# Patient Record
Sex: MALE | Race: WHITE | Employment: FULL TIME | URBAN - METROPOLITAN AREA
[De-identification: names, ages, dates, MRNs, and addresses within clinical notes are randomized per-mention and may not be internally consistent; named-entity substitution may affect disease eponyms.]

---

## 2017-09-19 ENCOUNTER — OFFICE VISIT (OUTPATIENT)
Dept: SURGERY | Age: 42
End: 2017-09-19

## 2017-09-19 VITALS
TEMPERATURE: 99.1 F | DIASTOLIC BLOOD PRESSURE: 84 MMHG | OXYGEN SATURATION: 98 % | BODY MASS INDEX: 38.36 KG/M2 | SYSTOLIC BLOOD PRESSURE: 124 MMHG | HEIGHT: 76 IN | RESPIRATION RATE: 18 BRPM | WEIGHT: 315 LBS | HEART RATE: 82 BPM

## 2017-09-19 DIAGNOSIS — K42.0 RECURRENT UMBILICAL HERNIA WITH INCARCERATION: Primary | ICD-10-CM

## 2017-09-19 RX ORDER — METFORMIN HYDROCHLORIDE 500 MG/1
500 TABLET ORAL 2 TIMES DAILY WITH MEALS
COMMUNITY
Start: 2017-09-15

## 2017-09-19 RX ORDER — AMLODIPINE BESYLATE AND BENAZEPRIL HYDROCHLORIDE 10; 40 MG/1; MG/1
1 CAPSULE ORAL DAILY
COMMUNITY
Start: 2017-09-15

## 2017-09-19 RX ORDER — TRAZODONE HYDROCHLORIDE 50 MG/1
50 TABLET ORAL
COMMUNITY
Start: 2017-09-15

## 2017-09-19 RX ORDER — ALLOPURINOL 300 MG/1
300 TABLET ORAL DAILY
COMMUNITY
Start: 2017-09-15

## 2017-09-19 RX ORDER — MONTELUKAST SODIUM 10 MG/1
10 TABLET ORAL DAILY
COMMUNITY
Start: 2017-09-15

## 2017-09-19 NOTE — LETTER
9/22/2017 1:44 PM 
 
Patient:  Alex Maier YOB: 1975 Date of Visit: 9/19/2017 Dear Karthik Ramirez MD 
[address] [facility], [place], operated by [facility] [address] VIA Facsimile: [phone] 
 : Thank you for referring Mr. Alex Maier to me for evaluation/treatment. Below are the relevant portions of my assessment and plan of care. If you have questions, please do not hesitate to call me. I look forward to following Mr. Syed King along with you. Sincerely, Kay Scott MD

## 2017-09-19 NOTE — MR AVS SNAPSHOT
Visit Information Date & Time Provider Department Dept. Phone Encounter #  
 9/19/2017  1:40 PM MD Renato [facility] [address] [phone] [id number] Your Appointments 10/16/2017 11:00 AM  
POST OP with ROMI Dejesus  
[facility] [address] ([address]) Appt Note: 8/38/57NF:[id number] recurrent umbilical hernia repair with mesh. lives 2 hr away [address] [facility] [address] [facility] [address]  
[address] [facility] Upcoming Health Maintenance Date Due DTaP/Tdap/Td series (1 - Tdap) 4/3/1998 INFLUENZA AGE 9 TO ADULT 8/1/2017 Allergies as of 9/19/2017  Never Reviewed Severity Noted Reaction Type Reactions Sulfa (Sulfonamide Antibiotics)  09/19/2017    Hives And breathing problems Current Immunizations  Never Reviewed No immunizations on file. Not reviewed this visit You Were Diagnosed With   
  
 Codes Comments Recurrent umbilical hernia with incarceration    -  Primary ICD-10-CM: K42.0 ICD-9-CM: 552.1 Vitals BP Pulse Temp Resp Height(growth percentile) Weight(growth percentile) 124/84 82 99.1 °F (37.3 °C) 18 6' 4\" (1.93 m) 318 lb (144.2 kg) SpO2 BMI Smoking Status 98% 38.71 kg/m2 Never Smoker Vitals History BMI and BSA Data Body Mass Index Body Surface Area 38.71 kg/m 2 2.78 m 2 Your Updated Medication List  
  
   
This list is accurate as of: 9/19/17 11:59 PM.  Always use your most recent med list.  
  
  
  
  
 allopurinol 300 mg tablet Commonly known as:  ZYLOPRIM  
  
 amLODIPine-benazepril 10-40 mg per capsule Commonly known as:  LOTREL  
  
 metFORMIN 500 mg tablet Commonly known as:  GLUCOPHAGE  
  
 montelukast 10 mg tablet Commonly known as:  SINGULAIR  
  
 traZODone 50 mg tablet Commonly known as:  Joana Odor Patient Instructions Abdominal Hernia Repair: Before Your Surgery What is abdominal hernia repair surgery? An abdominal hernia repair is a type of surgery. It fixes a problem called a hernia. A hernia is a bulge under the skin in your belly. It happens when you have a weak spot in your belly muscles and a piece of your intestines or tissues pokes through your muscles. This can cause pain. You may notice the pain most when you lift something heavy. You can have a hernia near your belly button. Or it may be in a scar from an earlier surgery. To fix it, the doctor will do one of two kinds of surgery. In open surgery, the doctor makes one cut near the hernia. This cut is called an incision. In laparoscopic surgery, the doctor makes several very small incisions and uses a thin, lighted scope and small tools. In either type of surgery, the doctor pushes the bulge back in place, if needed. Then the doctor sews the healthy tissue back together. Often the doctor patches the weak spot with a piece of material. 
Laparoscopic surgery leaves several small scars. Open surgery leaves one long scar. The scars fade with time. You will probably need to take 1 to 2 weeks off from work. But if your job requires heavy lifting or other physical work, you may need to take 4 to 6 weeks off. Follow-up care is a key part of your treatment and safety. Be sure to make and go to all appointments, and call your doctor if you are having problems. It's also a good idea to know your test results and keep a list of the medicines you take. What happens before surgery? Surgery can be stressful. This information will help you understand what you can expect. And it will help you safely prepare for surgery. Preparing for surgery · Understand exactly what surgery is planned, along with the risks, benefits, and other options. · Tell your doctors ALL the medicines, vitamins, supplements, and herbal remedies you take.  Some of these can increase the risk of bleeding or interact with anesthesia. · If you take blood thinners, such as warfarin (Coumadin), clopidogrel (Plavix), or aspirin, be sure to talk to your doctor. He or she will tell you if you should stop taking these medicines before your surgery. Make sure that you understand exactly what your doctor wants you to do. · Your doctor will tell you which medicines to take or stop before your surgery. You may need to stop taking certain medicines a week or more before surgery. So talk to your doctor as soon as you can. · If you have an advance directive, let your doctor know. It may include a living will and a durable power of  for health care. Bring a copy to the hospital. If you don't have one, you may want to prepare one. It lets your doctor and loved ones know your health care wishes. Doctors advise that everyone prepare these papers before any type of surgery or procedure. What happens on the day of surgery? · Follow the instructions exactly about when to stop eating and drinking. If you don't, your surgery may be canceled. If your doctor told you to take your medicines on the day of surgery, take them with only a sip of water. · Take a bath or shower before you come in for your surgery. Do not apply lotions, perfumes, deodorants, or nail polish. · Do not shave the surgical site yourself. · Take off all jewelry and piercings. And take out contact lenses, if you wear them. At the hospital or surgery center · Bring a picture ID. · The area for surgery is often marked to make sure there are no errors. · You will be kept comfortable and safe by your anesthesia provider. You will be asleep during the surgery. · The surgery will take 30 minutes to 2 hours. It depends on how large the hernia is and where it is. Going home · Be sure you have someone to drive you home. Anesthesia and pain medicine make it unsafe for you to drive.  
· You will be given more specific instructions about recovering from your surgery. They will cover things like diet, wound care, follow-up care, driving, and getting back to your normal routine. When should you call your doctor? · You have questions or concerns. · You don't understand how to prepare for your surgery. · You become ill before the surgery (such as fever, flu, or a cold). · You need to reschedule or have changed your mind about having the surgery. Where can you learn more? Go to http://renetta-mazin.info/. Enter U288 in the search box to learn more about \"Abdominal Hernia Repair: Before Your Surgery. \" Current as of: August 9, 2016 Content Version: 11.3 © 5094-2055 [employer]. Care instructions adapted under license by [employer] (which disclaims liability or warranty for this information). If you have questions about a medical condition or this instruction, always ask your healthcare professional. [address] any warranty or liability for your use of this information. Introducing [facility]! Yesi Ch introduces [employer] patient portal. Now you can access parts of your medical record, email your doctor's office, and request medication refills online. 1. In your internet browser, go to [email or web address] 2. Click on the First Time User? Click Here link in the Sign In box. You will see the New Member Sign Up page. 3. Enter your [employer] Access Code exactly as it appears below. You will not need to use this code after youve completed the sign-up process. If you do not sign up before the expiration date, you must request a new code. · [employer] Access Code: [email or web address] Expires: 12/18/2017  2:20 PM 
 
4. Enter the last four digits of your Social Security Number (xxxx) and Date of Birth (mm/dd/yyyy) as indicated and click Submit. You will be taken to the next sign-up page. 5. Create a [employer] ID.  This will be your [employer] login ID and cannot be changed, so think of one that is secure and easy to remember. 6. Create a [employer] password. You can change your password at any time. 7. Enter your Password Reset Question and Answer. This can be used at a later time if you forget your password. 8. Enter your e-mail address. You will receive e-mail notification when new information is available in [address] 9. Click Sign Up. You can now view and download portions of your medical record. 10. Click the Download Summary menu link to download a portable copy of your medical information. If you have questions, please visit the Frequently Asked Questions section of the [employer] website. Remember, [employer] is NOT to be used for urgent needs. For medical emergencies, dial 911. Now available from your iPhone and Android! Please provide this summary of care documentation to your next provider. Your primary care clinician is listed as [place]. If you have any questions after today's visit, please call [phone].

## 2017-09-19 NOTE — PROGRESS NOTES
1. Have you been to the ER, urgent care clinic since your last visit? Hospitalized since your last visit? [address] ed pain abd    2. Have you seen or consulted any other health care providers outside of the [address] [place] [address] since your last visit? Include any pap smears or colon screening.    PCP

## 2017-09-22 NOTE — PROGRESS NOTES
[facility] General Surgery History and Physical    History of Present Illness:      Juarez Krishnan is a 38 y.o. male who has a recurrent umbilical hernia. He had a previous open umbilical hernia in 6091 repaired open without mesh. He has been having some increasing pain over the last few months. He has noticed the bulge just above the umbilicus getting bigger. The pain is a 3 of 10. He does not have any nausea, vomiting, or changes in BM. Past Medical History:   Diagnosis Date    Asthma     Diabetes ([address]     Hypertension        Past Surgical History:   Procedure Laterality Date    HX HERNIA REPAIR      HX PARATHYROIDECTOMY           Current Outpatient Prescriptions:     allopurinol (ZYLOPRIM) 300 mg tablet, , Disp: , Rfl:     amLODIPine-benazepril (LOTREL) 10-40 mg per capsule, , Disp: , Rfl:     traZODone (DESYREL) 50 mg tablet, , Disp: , Rfl:     metFORMIN (GLUCOPHAGE) 500 mg tablet, , Disp: , Rfl:     montelukast (SINGULAIR) 10 mg tablet, , Disp: , Rfl:     Allergies   Allergen Reactions    Sulfa (Sulfonamide Antibiotics) Hives     And breathing problems       Social History     Social History    Marital status:      Spouse name: N/A    Number of children: N/A    Years of education: N/A     Occupational History    Not on file.      Social History Main Topics    Smoking status: Never Smoker    Smokeless tobacco: Never Used    Alcohol use Yes    Drug use: No    Sexual activity: Not on file     Other Topics Concern    Not on file     Social History Narrative       Family History   Problem Relation Age of Onset    Heart Disease Father     Hypertension Father     Diabetes Maternal Grandmother     Cancer Maternal Grandfather        ROS   Constitutional: negative  Ears, Nose, Mouth, Throat, and Face: negative  Respiratory: negative  Cardiovascular: negative  Gastrointestinal: positive for abdominal pain  Genitourinary:negative  Integument/Breast: negative  Hematologic/Lymphatic: negative  Behavioral/Psychiatric: negative  Allergic/Immunologic: negative      Physical Exam:     Visit Vitals    /84    Pulse 82    Temp 99.1 °F (37.3 °C)    Resp 18    Ht 6' 4\" (1.93 m)    Wt 318 lb (144.2 kg)    SpO2 98%    BMI 38.71 kg/m2       General - alert and oriented, no apparent distress  HEENT - no jaundice, no hearing imparement  Pulm - CTAB, no C/W/R  CV - RRR, no M/R/G  Abd - soft, ND, BS present, umbilical hernia present just above the umbilicus, mild TTP, nonreducible, feels like fatty tissue in hernia, no erythema  Ext - pulses intact in UE and LE bilaterally, no edema  Skin - supple, no rashes  Psychiatric - normal affect, good mood    Labs  none    Imaging  CT from [facility] - 6.9PL umbilical hernia present with fat in the defect  I have reviewed and agree with all of the pertinent images    Assessment:     Joy Patel is a 38 y.o. male with recurrent incarcerated umbilical hernia    Recommendations:     1. He will need robotic recurrent umbilical hernia repair with mesh the OR. I have discussed the above procedure with the patient in detail. We reviewed the benefits and possible complications of the surgery which include bleeding, infection, damage to adjacent organs, venous thromboembolism, need for repeat surgery, death and other unforseen complications. The patient agreed to proceed with the surgery. Cong Guzman MD    Mr. Mariella Yu has a reminder for a \"due or due soon\" health maintenance. I have asked that he contact his primary care provider for follow-up on this health maintenance.

## 2017-09-22 NOTE — PATIENT INSTRUCTIONS
Abdominal Hernia Repair: Before Your Surgery  What is abdominal hernia repair surgery? An abdominal hernia repair is a type of surgery. It fixes a problem called a hernia. A hernia is a bulge under the skin in your belly. It happens when you have a weak spot in your belly muscles and a piece of your intestines or tissues pokes through your muscles. This can cause pain. You may notice the pain most when you lift something heavy. You can have a hernia near your belly button. Or it may be in a scar from an earlier surgery. To fix it, the doctor will do one of two kinds of surgery. In open surgery, the doctor makes one cut near the hernia. This cut is called an incision. In laparoscopic surgery, the doctor makes several very small incisions and uses a thin, lighted scope and small tools. In either type of surgery, the doctor pushes the bulge back in place, if needed. Then the doctor sews the healthy tissue back together. Often the doctor patches the weak spot with a piece of material.  Laparoscopic surgery leaves several small scars. Open surgery leaves one long scar. The scars fade with time. You will probably need to take 1 to 2 weeks off from work. But if your job requires heavy lifting or other physical work, you may need to take 4 to 6 weeks off. Follow-up care is a key part of your treatment and safety. Be sure to make and go to all appointments, and call your doctor if you are having problems. It's also a good idea to know your test results and keep a list of the medicines you take. What happens before surgery? Surgery can be stressful. This information will help you understand what you can expect. And it will help you safely prepare for surgery. Preparing for surgery  · Understand exactly what surgery is planned, along with the risks, benefits, and other options. · Tell your doctors ALL the medicines, vitamins, supplements, and herbal remedies you take.  Some of these can increase the risk of bleeding or interact with anesthesia. · If you take blood thinners, such as warfarin (Coumadin), clopidogrel (Plavix), or aspirin, be sure to talk to your doctor. He or she will tell you if you should stop taking these medicines before your surgery. Make sure that you understand exactly what your doctor wants you to do. · Your doctor will tell you which medicines to take or stop before your surgery. You may need to stop taking certain medicines a week or more before surgery. So talk to your doctor as soon as you can. · If you have an advance directive, let your doctor know. It may include a living will and a durable power of  for health care. Bring a copy to the hospital. If you don't have one, you may want to prepare one. It lets your doctor and loved ones know your health care wishes. Doctors advise that everyone prepare these papers before any type of surgery or procedure. What happens on the day of surgery? · Follow the instructions exactly about when to stop eating and drinking. If you don't, your surgery may be canceled. If your doctor told you to take your medicines on the day of surgery, take them with only a sip of water. · Take a bath or shower before you come in for your surgery. Do not apply lotions, perfumes, deodorants, or nail polish. · Do not shave the surgical site yourself. · Take off all jewelry and piercings. And take out contact lenses, if you wear them. At the hospital or surgery center  · Bring a picture ID. · The area for surgery is often marked to make sure there are no errors. · You will be kept comfortable and safe by your anesthesia provider. You will be asleep during the surgery. · The surgery will take 30 minutes to 2 hours. It depends on how large the hernia is and where it is. Going home  · Be sure you have someone to drive you home. Anesthesia and pain medicine make it unsafe for you to drive.   · You will be given more specific instructions about recovering from your surgery. They will cover things like diet, wound care, follow-up care, driving, and getting back to your normal routine. When should you call your doctor? · You have questions or concerns. · You don't understand how to prepare for your surgery. · You become ill before the surgery (such as fever, flu, or a cold). · You need to reschedule or have changed your mind about having the surgery. Where can you learn more? Go to http://renetta-mazin.info/. Enter U288 in the search box to learn more about \"Abdominal Hernia Repair: Before Your Surgery. \"  Current as of: August 9, 2016  Content Version: 11.3  © 0124-4514 [employer] Care instructions adapted under license by [employer] (which disclaims liability or warranty for this information). If you have questions about a medical condition or this instruction, always ask your healthcare professional. [address] any warranty or liability for your use of this information.

## 2017-09-27 NOTE — PERIOP NOTES
PAT instructions reviewed with patient and given the opportunity to ask questions. Connect care message sent to JAILYN Franco LPN/Dr. Dacosta's office re: preop orders needed for patient to include BMP, EKG,CXR per anesthesia protocol.

## 2017-09-28 ENCOUNTER — DOCUMENTATION ONLY (OUTPATIENT)
Dept: SURGERY | Age: 42
End: 2017-09-28

## 2017-09-28 NOTE — PROGRESS NOTES
PREOP TEL. CALL COMPLETED ON THIS PATIENT TODAY.  NO RECENT LABS, EKG, CXR SEEN IN [facility]. PATIENT HAS HTN, DM, ASTHMA. ANESTHESIA PROTOCOL REQUIRES BMP, EKG, CXR FOR THESE CONDITIONS. ORDERS SIGNED AND HELD DO NOT INCLUDE THESE TESTS. THANKS FOR YOUR ATTENTION TO THIS MATTER. Shaista Colorado RN                Message given to Dr Sandi Mancuso.

## 2017-09-29 ENCOUNTER — ANESTHESIA EVENT (OUTPATIENT)
Dept: SURGERY | Age: 42
End: 2017-09-29
Payer: COMMERCIAL

## 2017-09-29 ENCOUNTER — ANESTHESIA (OUTPATIENT)
Dept: SURGERY | Age: 42
End: 2017-09-29
Payer: COMMERCIAL

## 2017-09-29 ENCOUNTER — HOSPITAL ENCOUNTER (OUTPATIENT)
Age: 42
Setting detail: OUTPATIENT SURGERY
Discharge: HOME | End: 2017-09-29
Attending: SURGERY | Admitting: SURGERY
Payer: COMMERCIAL

## 2017-09-29 VITALS
HEART RATE: 75 BPM | TEMPERATURE: 97.8 F | SYSTOLIC BLOOD PRESSURE: 159 MMHG | DIASTOLIC BLOOD PRESSURE: 98 MMHG | BODY MASS INDEX: 37.19 KG/M2 | OXYGEN SATURATION: 96 % | WEIGHT: 315 LBS | RESPIRATION RATE: 13 BRPM | HEIGHT: 77 IN

## 2017-09-29 LAB
GLUCOSE BLD STRIP.AUTO-MCNC: 148 MG/DL (ref 65–100)
GLUCOSE BLD STRIP.AUTO-MCNC: 188 MG/DL (ref 65–100)
SERVICE CMNT-IMP: ABNORMAL
SERVICE CMNT-IMP: ABNORMAL

## 2017-09-29 PROCEDURE — 76060000036 HC ANESTHESIA 2.5 TO 3 HR: Performed by: SURGERY

## 2017-09-29 PROCEDURE — 76010000877 HC OR TIME 2.5 TO 3HR INTENSV - TIER 2: Performed by: SURGERY

## 2017-09-29 PROCEDURE — 77030011640 HC PAD GRND REM COVD -A: Performed by: SURGERY

## 2017-09-29 PROCEDURE — 77030002933 HC SUT MCRYL J&J -A: Performed by: SURGERY

## 2017-09-29 PROCEDURE — 77030020703 HC SEAL CANN DISP INTU -B: Performed by: SURGERY

## 2017-09-29 PROCEDURE — 77030010507 HC ADH SKN DERMBND J&J -B: Performed by: SURGERY

## 2017-09-29 PROCEDURE — 82962 GLUCOSE BLOOD TEST: CPT

## 2017-09-29 PROCEDURE — 74011000250 HC RX REV CODE- 250

## 2017-09-29 PROCEDURE — 77030020782 HC GWN BAIR PAWS FLX 3M -B

## 2017-09-29 PROCEDURE — 74011250636 HC RX REV CODE- 250/636: Performed by: ANESTHESIOLOGY

## 2017-09-29 PROCEDURE — C1781 MESH (IMPLANTABLE): HCPCS | Performed by: SURGERY

## 2017-09-29 PROCEDURE — 77030035278 HC STPLR SEAL ENDOWR INTU -B: Performed by: SURGERY

## 2017-09-29 PROCEDURE — 77030036554: Performed by: SURGERY

## 2017-09-29 PROCEDURE — 77030032490 HC SLV COMPR SCD KNE COVD -B: Performed by: SURGERY

## 2017-09-29 PROCEDURE — 77030031139 HC SUT VCRL2 J&J -A: Performed by: SURGERY

## 2017-09-29 PROCEDURE — 74011250636 HC RX REV CODE- 250/636: Performed by: SURGERY

## 2017-09-29 PROCEDURE — 74011000250 HC RX REV CODE- 250: Performed by: SURGERY

## 2017-09-29 PROCEDURE — 74011250637 HC RX REV CODE- 250/637

## 2017-09-29 PROCEDURE — 77030035489 HC REDUCR CANN ENDOWR INTU -C: Performed by: SURGERY

## 2017-09-29 PROCEDURE — 74011250636 HC RX REV CODE- 250/636

## 2017-09-29 PROCEDURE — 77030035048 HC TRCR ENDOSC OPTCL COVD -B: Performed by: SURGERY

## 2017-09-29 PROCEDURE — 77030008557 HC TBNG SMK EVAC STOR -B: Performed by: SURGERY

## 2017-09-29 PROCEDURE — 76210000016 HC OR PH I REC 1 TO 1.5 HR: Performed by: SURGERY

## 2017-09-29 PROCEDURE — 77030035277 HC OBTRTR BLDELSS DISP INTU -B: Performed by: SURGERY

## 2017-09-29 PROCEDURE — 77030002895 HC DEV VASC CLOSR COVD -B: Performed by: SURGERY

## 2017-09-29 PROCEDURE — 77030018836 HC SOL IRR NACL ICUM -A: Performed by: SURGERY

## 2017-09-29 PROCEDURE — 76210000020 HC REC RM PH II FIRST 0.5 HR: Performed by: SURGERY

## 2017-09-29 DEVICE — MESH HERN CIRCLE 4.5IN -- VENTRALIGHT S/T: Type: IMPLANTABLE DEVICE | Site: UMBILICAL | Status: FUNCTIONAL

## 2017-09-29 RX ORDER — ACETAMINOPHEN 10 MG/ML
INJECTION, SOLUTION INTRAVENOUS AS NEEDED
Status: DISCONTINUED | OUTPATIENT
Start: 2017-09-29 | End: 2017-09-29 | Stop reason: HOSPADM

## 2017-09-29 RX ORDER — OXYCODONE AND ACETAMINOPHEN 5; 325 MG/1; MG/1
1 TABLET ORAL ONCE
Status: COMPLETED | OUTPATIENT
Start: 2017-09-29 | End: 2017-09-29

## 2017-09-29 RX ORDER — ONDANSETRON 2 MG/ML
4 INJECTION INTRAMUSCULAR; INTRAVENOUS AS NEEDED
Status: DISCONTINUED | OUTPATIENT
Start: 2017-09-29 | End: 2017-09-29 | Stop reason: HOSPADM

## 2017-09-29 RX ORDER — ROCURONIUM BROMIDE 10 MG/ML
INJECTION, SOLUTION INTRAVENOUS AS NEEDED
Status: DISCONTINUED | OUTPATIENT
Start: 2017-09-29 | End: 2017-09-29 | Stop reason: HOSPADM

## 2017-09-29 RX ORDER — SODIUM CHLORIDE 0.9 % (FLUSH) 0.9 %
5-10 SYRINGE (ML) INJECTION AS NEEDED
Status: DISCONTINUED | OUTPATIENT
Start: 2017-09-29 | End: 2017-09-29 | Stop reason: HOSPADM

## 2017-09-29 RX ORDER — OXYCODONE AND ACETAMINOPHEN 5; 325 MG/1; MG/1
TABLET ORAL
Status: COMPLETED
Start: 2017-09-29 | End: 2017-09-29

## 2017-09-29 RX ORDER — SODIUM CHLORIDE, SODIUM LACTATE, POTASSIUM CHLORIDE, CALCIUM CHLORIDE 600; 310; 30; 20 MG/100ML; MG/100ML; MG/100ML; MG/100ML
100 INJECTION, SOLUTION INTRAVENOUS CONTINUOUS
Status: DISCONTINUED | OUTPATIENT
Start: 2017-09-29 | End: 2017-09-29 | Stop reason: HOSPADM

## 2017-09-29 RX ORDER — MIDAZOLAM HYDROCHLORIDE 1 MG/ML
INJECTION, SOLUTION INTRAMUSCULAR; INTRAVENOUS AS NEEDED
Status: DISCONTINUED | OUTPATIENT
Start: 2017-09-29 | End: 2017-09-29 | Stop reason: HOSPADM

## 2017-09-29 RX ORDER — SUCCINYLCHOLINE CHLORIDE 20 MG/ML
INJECTION INTRAMUSCULAR; INTRAVENOUS AS NEEDED
Status: DISCONTINUED | OUTPATIENT
Start: 2017-09-29 | End: 2017-09-29 | Stop reason: HOSPADM

## 2017-09-29 RX ORDER — NEOSTIGMINE METHYLSULFATE 1 MG/ML
INJECTION INTRAVENOUS AS NEEDED
Status: DISCONTINUED | OUTPATIENT
Start: 2017-09-29 | End: 2017-09-29 | Stop reason: HOSPADM

## 2017-09-29 RX ORDER — GLYCOPYRROLATE 0.2 MG/ML
INJECTION INTRAMUSCULAR; INTRAVENOUS AS NEEDED
Status: DISCONTINUED | OUTPATIENT
Start: 2017-09-29 | End: 2017-09-29 | Stop reason: HOSPADM

## 2017-09-29 RX ORDER — FENTANYL CITRATE 50 UG/ML
50 INJECTION, SOLUTION INTRAMUSCULAR; INTRAVENOUS AS NEEDED
Status: DISCONTINUED | OUTPATIENT
Start: 2017-09-29 | End: 2017-09-29 | Stop reason: HOSPADM

## 2017-09-29 RX ORDER — LIDOCAINE HYDROCHLORIDE 20 MG/ML
INJECTION, SOLUTION EPIDURAL; INFILTRATION; INTRACAUDAL; PERINEURAL AS NEEDED
Status: DISCONTINUED | OUTPATIENT
Start: 2017-09-29 | End: 2017-09-29 | Stop reason: HOSPADM

## 2017-09-29 RX ORDER — FENTANYL CITRATE 50 UG/ML
25 INJECTION, SOLUTION INTRAMUSCULAR; INTRAVENOUS
Status: COMPLETED | OUTPATIENT
Start: 2017-09-29 | End: 2017-09-29

## 2017-09-29 RX ORDER — OXYCODONE AND ACETAMINOPHEN 5; 325 MG/1; MG/1
1-2 TABLET ORAL
Qty: 40 TAB | Refills: 0 | Status: SHIPPED | OUTPATIENT
Start: 2017-09-29 | End: 2017-10-16 | Stop reason: ALTCHOICE

## 2017-09-29 RX ORDER — SODIUM CHLORIDE, SODIUM LACTATE, POTASSIUM CHLORIDE, CALCIUM CHLORIDE 600; 310; 30; 20 MG/100ML; MG/100ML; MG/100ML; MG/100ML
INJECTION, SOLUTION INTRAVENOUS
Status: DISCONTINUED | OUTPATIENT
Start: 2017-09-29 | End: 2017-09-29 | Stop reason: HOSPADM

## 2017-09-29 RX ORDER — LABETALOL HYDROCHLORIDE 5 MG/ML
INJECTION, SOLUTION INTRAVENOUS AS NEEDED
Status: DISCONTINUED | OUTPATIENT
Start: 2017-09-29 | End: 2017-09-29 | Stop reason: HOSPADM

## 2017-09-29 RX ORDER — HYDROMORPHONE HYDROCHLORIDE 1 MG/ML
INJECTION, SOLUTION INTRAMUSCULAR; INTRAVENOUS; SUBCUTANEOUS AS NEEDED
Status: DISCONTINUED | OUTPATIENT
Start: 2017-09-29 | End: 2017-09-29 | Stop reason: HOSPADM

## 2017-09-29 RX ORDER — ONDANSETRON 2 MG/ML
INJECTION INTRAMUSCULAR; INTRAVENOUS AS NEEDED
Status: DISCONTINUED | OUTPATIENT
Start: 2017-09-29 | End: 2017-09-29 | Stop reason: HOSPADM

## 2017-09-29 RX ORDER — FENTANYL CITRATE 50 UG/ML
INJECTION, SOLUTION INTRAMUSCULAR; INTRAVENOUS AS NEEDED
Status: DISCONTINUED | OUTPATIENT
Start: 2017-09-29 | End: 2017-09-29 | Stop reason: HOSPADM

## 2017-09-29 RX ORDER — MIDAZOLAM HYDROCHLORIDE 1 MG/ML
0.5 INJECTION, SOLUTION INTRAMUSCULAR; INTRAVENOUS
Status: DISCONTINUED | OUTPATIENT
Start: 2017-09-29 | End: 2017-09-29 | Stop reason: HOSPADM

## 2017-09-29 RX ORDER — DEXAMETHASONE SODIUM PHOSPHATE 4 MG/ML
INJECTION, SOLUTION INTRA-ARTICULAR; INTRALESIONAL; INTRAMUSCULAR; INTRAVENOUS; SOFT TISSUE AS NEEDED
Status: DISCONTINUED | OUTPATIENT
Start: 2017-09-29 | End: 2017-09-29 | Stop reason: HOSPADM

## 2017-09-29 RX ORDER — BUPIVACAINE HYDROCHLORIDE AND EPINEPHRINE 5; 5 MG/ML; UG/ML
30 INJECTION, SOLUTION EPIDURAL; INTRACAUDAL; PERINEURAL ONCE
Status: DISCONTINUED | OUTPATIENT
Start: 2017-09-29 | End: 2017-09-29 | Stop reason: HOSPADM

## 2017-09-29 RX ORDER — HYDROMORPHONE HYDROCHLORIDE 1 MG/ML
0.5 INJECTION, SOLUTION INTRAMUSCULAR; INTRAVENOUS; SUBCUTANEOUS
Status: DISCONTINUED | OUTPATIENT
Start: 2017-09-29 | End: 2017-09-29 | Stop reason: HOSPADM

## 2017-09-29 RX ORDER — MORPHINE SULFATE 10 MG/ML
2 INJECTION, SOLUTION INTRAMUSCULAR; INTRAVENOUS
Status: DISCONTINUED | OUTPATIENT
Start: 2017-09-29 | End: 2017-09-29 | Stop reason: HOSPADM

## 2017-09-29 RX ORDER — LIDOCAINE HYDROCHLORIDE 10 MG/ML
0.1 INJECTION, SOLUTION EPIDURAL; INFILTRATION; INTRACAUDAL; PERINEURAL AS NEEDED
Status: DISCONTINUED | OUTPATIENT
Start: 2017-09-29 | End: 2017-09-29 | Stop reason: HOSPADM

## 2017-09-29 RX ORDER — MIDAZOLAM HYDROCHLORIDE 1 MG/ML
1 INJECTION, SOLUTION INTRAMUSCULAR; INTRAVENOUS AS NEEDED
Status: DISCONTINUED | OUTPATIENT
Start: 2017-09-29 | End: 2017-09-29 | Stop reason: HOSPADM

## 2017-09-29 RX ORDER — ROPIVACAINE HYDROCHLORIDE 5 MG/ML
30 INJECTION, SOLUTION EPIDURAL; INFILTRATION; PERINEURAL AS NEEDED
Status: DISCONTINUED | OUTPATIENT
Start: 2017-09-29 | End: 2017-09-29 | Stop reason: HOSPADM

## 2017-09-29 RX ORDER — SODIUM CHLORIDE 9 MG/ML
25 INJECTION, SOLUTION INTRAVENOUS CONTINUOUS
Status: DISCONTINUED | OUTPATIENT
Start: 2017-09-29 | End: 2017-09-29 | Stop reason: HOSPADM

## 2017-09-29 RX ORDER — SODIUM CHLORIDE 0.9 % (FLUSH) 0.9 %
5-10 SYRINGE (ML) INJECTION EVERY 8 HOURS
Status: DISCONTINUED | OUTPATIENT
Start: 2017-09-29 | End: 2017-09-29 | Stop reason: HOSPADM

## 2017-09-29 RX ORDER — DIPHENHYDRAMINE HYDROCHLORIDE 50 MG/ML
12.5 INJECTION, SOLUTION INTRAMUSCULAR; INTRAVENOUS AS NEEDED
Status: DISCONTINUED | OUTPATIENT
Start: 2017-09-29 | End: 2017-09-29 | Stop reason: HOSPADM

## 2017-09-29 RX ORDER — BUPIVACAINE HYDROCHLORIDE AND EPINEPHRINE 2.5; 5 MG/ML; UG/ML
INJECTION, SOLUTION EPIDURAL; INFILTRATION; INTRACAUDAL; PERINEURAL AS NEEDED
Status: DISCONTINUED | OUTPATIENT
Start: 2017-09-29 | End: 2017-09-29 | Stop reason: HOSPADM

## 2017-09-29 RX ADMIN — DEXAMETHASONE SODIUM PHOSPHATE 4 MG: 4 INJECTION, SOLUTION INTRA-ARTICULAR; INTRALESIONAL; INTRAMUSCULAR; INTRAVENOUS; SOFT TISSUE at 08:18

## 2017-09-29 RX ADMIN — ONDANSETRON 4 MG: 2 INJECTION INTRAMUSCULAR; INTRAVENOUS at 08:18

## 2017-09-29 RX ADMIN — ROCURONIUM BROMIDE 5 MG: 10 INJECTION, SOLUTION INTRAVENOUS at 09:01

## 2017-09-29 RX ADMIN — LIDOCAINE HYDROCHLORIDE 100 MG: 20 INJECTION, SOLUTION EPIDURAL; INFILTRATION; INTRACAUDAL; PERINEURAL at 07:36

## 2017-09-29 RX ADMIN — ONDANSETRON 4 MG: 2 INJECTION INTRAMUSCULAR; INTRAVENOUS at 10:52

## 2017-09-29 RX ADMIN — SODIUM CHLORIDE, SODIUM LACTATE, POTASSIUM CHLORIDE, CALCIUM CHLORIDE: 600; 310; 30; 20 INJECTION, SOLUTION INTRAVENOUS at 07:27

## 2017-09-29 RX ADMIN — NEOSTIGMINE METHYLSULFATE 4 MG: 1 INJECTION INTRAVENOUS at 10:06

## 2017-09-29 RX ADMIN — ACETAMINOPHEN 1000 MG: 10 INJECTION, SOLUTION INTRAVENOUS at 08:01

## 2017-09-29 RX ADMIN — HYDROMORPHONE HYDROCHLORIDE 0.25 MG: 1 INJECTION, SOLUTION INTRAMUSCULAR; INTRAVENOUS; SUBCUTANEOUS at 09:58

## 2017-09-29 RX ADMIN — OXYCODONE HYDROCHLORIDE AND ACETAMINOPHEN 1 TABLET: 5; 325 TABLET ORAL at 11:33

## 2017-09-29 RX ADMIN — GLYCOPYRROLATE 0.2 MG: 0.2 INJECTION INTRAMUSCULAR; INTRAVENOUS at 10:06

## 2017-09-29 RX ADMIN — HYDROMORPHONE HYDROCHLORIDE 0.5 MG: 1 INJECTION, SOLUTION INTRAMUSCULAR; INTRAVENOUS; SUBCUTANEOUS at 07:54

## 2017-09-29 RX ADMIN — FENTANYL CITRATE 25 MCG: 50 INJECTION, SOLUTION INTRAMUSCULAR; INTRAVENOUS at 11:06

## 2017-09-29 RX ADMIN — FENTANYL CITRATE 25 MCG: 50 INJECTION, SOLUTION INTRAMUSCULAR; INTRAVENOUS at 11:01

## 2017-09-29 RX ADMIN — FENTANYL CITRATE 25 MCG: 50 INJECTION, SOLUTION INTRAMUSCULAR; INTRAVENOUS at 10:51

## 2017-09-29 RX ADMIN — ROCURONIUM BROMIDE 5 MG: 10 INJECTION, SOLUTION INTRAVENOUS at 09:19

## 2017-09-29 RX ADMIN — MIDAZOLAM HYDROCHLORIDE 2 MG: 1 INJECTION, SOLUTION INTRAMUSCULAR; INTRAVENOUS at 07:27

## 2017-09-29 RX ADMIN — HYDROMORPHONE HYDROCHLORIDE 0.25 MG: 1 INJECTION, SOLUTION INTRAMUSCULAR; INTRAVENOUS; SUBCUTANEOUS at 10:10

## 2017-09-29 RX ADMIN — ROCURONIUM BROMIDE 20 MG: 10 INJECTION, SOLUTION INTRAVENOUS at 08:16

## 2017-09-29 RX ADMIN — CEFAZOLIN 3 G: 1 INJECTION, POWDER, FOR SOLUTION INTRAMUSCULAR; INTRAVENOUS; PARENTERAL at 08:00

## 2017-09-29 RX ADMIN — FENTANYL CITRATE 25 MCG: 50 INJECTION, SOLUTION INTRAMUSCULAR; INTRAVENOUS at 10:56

## 2017-09-29 RX ADMIN — SUCCINYLCHOLINE CHLORIDE 200 MG: 20 INJECTION INTRAMUSCULAR; INTRAVENOUS at 07:36

## 2017-09-29 RX ADMIN — ROCURONIUM BROMIDE 5 MG: 10 INJECTION, SOLUTION INTRAVENOUS at 07:36

## 2017-09-29 RX ADMIN — ROCURONIUM BROMIDE 35 MG: 10 INJECTION, SOLUTION INTRAVENOUS at 07:56

## 2017-09-29 RX ADMIN — OXYCODONE AND ACETAMINOPHEN 1 TABLET: 5; 325 TABLET ORAL at 11:33

## 2017-09-29 RX ADMIN — LABETALOL HYDROCHLORIDE 10 MG: 5 INJECTION, SOLUTION INTRAVENOUS at 08:14

## 2017-09-29 RX ADMIN — FENTANYL CITRATE 100 MCG: 50 INJECTION, SOLUTION INTRAMUSCULAR; INTRAVENOUS at 07:36

## 2017-09-29 NOTE — INTERVAL H&P NOTE
H&P Update:  Jerry Miller was seen and examined. History and physical has been reviewed. The patient has been examined. There have been no significant clinical changes since the completion of the originally dated History and Physical.  Patient identified by surgeon; surgical site was confirmed by patient and surgeon.     Signed By: Sofie Arevalo MD     September 29, 2017 7:22 AM

## 2017-09-29 NOTE — ANESTHESIA POSTPROCEDURE EVALUATION
Post-Anesthesia Evaluation and Assessment    Patient: Citlaly Bowens MRN: [id number]  SSN: [id number]    YOB: 1975  Age: 38 y.o. Sex: male       Cardiovascular Function/Vital Signs  Visit Vitals    BP (!) 159/98    Pulse 75    Temp 36.6 °C (97.8 °F)    Resp 13    Ht 6' 5\" (1.956 m)    Wt 145.2 kg (320 lb)    SpO2 96%    BMI 37.95 kg/m2       Patient is status post general anesthesia for Procedure(s):  DAVINCI RECURRENT UMBILICAL HERNIA REPAIR WITH MESH. Nausea/Vomiting: None    Postoperative hydration reviewed and adequate. Pain:  Pain Scale 1: Numeric (0 - 10) (09/29/17 1122)  Pain Intensity 1: 2 (09/29/17 1122)   Managed    Neurological Status:   Neuro (WDL): Within Defined Limits (09/29/17 1122)   At baseline    Mental Status and Level of Consciousness: Arousable    Pulmonary Status:   O2 Device: Room air (09/29/17 1122)   Adequate oxygenation and airway patent    Complications related to anesthesia: None    Post-anesthesia assessment completed.  No concerns    Signed By: Ammon Matthews MD     September 29, 2017

## 2017-09-29 NOTE — BRIEF OP NOTE
BRIEF OPERATIVE NOTE    Date of Procedure: 9/29/2017   Preoperative Diagnosis: INCARCERATED RECURRENT UMBILICAL HERNIA  Postoperative Diagnosis: INCARCERATED RECURRENT UMBILICAL HERNIA    Procedure(s):  DAVINCI RECURRENT UMBILICAL HERNIA REPAIR WITH MESH  Surgeon(s) and Role:     * Betito Rooney MD - Primary         Assistant Staff:       Surgical Staff:  Circ-1: Roseline Beaver RN  Circ-Relief: Sandra Krishnan RN  Scrub Tech-1: Shanel Villalobos  Surg Asst-1: Stanton Wilkerson  Event Time In   Incision Start 0801   Incision Close      Anesthesia: General   Estimated Blood Loss: none  Specimens: * No specimens in log *   Findings: 2.5cm hernia defect closed primarily and a 11cm round Bard Ventralyte ST mesh used   Complications: none  Implants:   Implant Name Type Inv. Item Serial No.  Lot No. LRB No. Used Action   MESH ERICH [place] 4. [place] S/T - [id number]   MESH ERICH [place] 4. 5IN -- VENTRALIGHT S/T   BARD DAVOL [id number] Left 1 Implanted

## 2017-09-29 NOTE — OP NOTES
[address]   [address] [place] [address], [address]   OP NOTE       Name:  Britton Hood   MR#:  [id number]   :  1975   Account #:  [de-identified]    Surgery Date:  2017   Date of Adm:  2017       PREOPERATIVE DIAGNOSIS: Incarcerated recurrent umbilical   hernia. POSTOPERATIVE DIAGNOSIS: Incarcerated recurrent umbilical   hernia. PROCEDURE PERFORMED: Laparoscopic robotic-assisted recurrent   umbilical hernia repair with mesh. SURGEON: Dianne Cunha MD.    ASSISTANT: Surgical assistant, Adin Medley. INDICATION FOR OPERATION: The patient is a 53-year-old male   who has a previous history of an open umbilical hernia repair without   mesh a number of years ago that has a recurrent hernia above the   umbilicus in the same area that is needing repair in the operating   room. DESCRIPTION OF PROCEDURE: The patient was met in the preop   holding area. The H and P was updated. Consent was signed and risks   and benefits were explained to the patient prior to the start of the   operation. He was taken back to the operating room and was laid in   the supine position. The abdomen was prepped and draped in   standard sterile fashion. Timeout was called. Antibiotics were given. SCDs were on lower extremities. We started the operation by making   an 8 mm incision to the left upper quadrant, inserting a Visiport trocar   in the intra-abdominal cavity, insufflating to 15 mmHg. We then placed   an 8 mm left lateral trocar and then a 12 mm left lower quadrant trocar. We then docked the [employer] robot to the patient. Once we did that, we   then could visualize the hernia and the omental fat that was   incarcerated in the umbilical hernia defect. We could see some sutures   from the previous repair that were still in place. We then manually   removed the fatty omentum from the hernia space and reduced that   back into the abdominal cavity.  We then measured the defect to measure about 2.5 cm around. We then closed that primarily with a 0   V-Loc suture in a running fashion using a permanent suture. The   defect was closed primarily completely and then we placed an 11 cm   round Ventralight ST mesh into the abdominal cavity. We used a 0   Vicryl suture in the middle of the mesh as a center anchoring stitch. We then used the Endo Close device and brought the 2 sutures up   through the midline middle of the defect closure to center our mesh. The mesh was centered directly over top of the closure. We then   sutured the mesh into place with a running 0 [employer]-Chris suture   circumferentially around the mesh using 3 sutures. The mesh was in   perfect position up against the abdominal wall. We then undocked the   [employer] robot and we then used 0 PDS sutures for transfascial fixation   of the mesh to the abdominal wall. We did this in the superior, inferior   and right and left lateral portions of the mesh using 4 stitches total. The   mesh was then completely reinforced. We then looked back at the   mesh and the mesh was in perfect position. No bleeding had occurred. We then closed the 12 mm left lower quadrant fascial defect with 0   Vicryl suture on an Endo Close suture passing device in an interrupted   fashion. We then desufflated the abdominal cavity, removed the   trocars, and closed the skin with 4-0 Monocryl and Dermabond to   complete the operation. Dr. Sandy Walter was present and scrubbed during the   entire operation. Counts were correct. ANESTHESIA: General.    ESTIMATED BLOOD LOSS: None. SPECIMENS REMOVED: none     OPERATIVE FINDINGS: A 2.5 cm umbilical hernia defect closed   primarily with an 11 cm round Bard Ventralight ST mesh. COMPLICATIONS: None.         MD CARLIE Roy / Abbie Lopez   D:  09/29/2017   10:11   T:  09/29/2017   10:35   Job #:  [id number]

## 2017-09-29 NOTE — PERIOP NOTES
Patient: Jovanna Patel MRN: [id number]  SSN: [id number]   YOB: 1975  Age: 38 y.o. Sex: male     Patient is status post Procedure(s):  DAVINCI RECURRENT UMBILICAL HERNIA REPAIR WITH MESH. Surgeon(s) and Role:     * Arthur Win MD - Primary    Local/Dose/Irrigation:  0.25% BUPIVACAINE W EPI                  Peripheral IV 09/29/17 Right Hand (Active)   Site Assessment Clean, dry, & intact 9/29/2017  6:33 AM   Phlebitis Assessment 0 9/29/2017  6:33 AM   Infiltration Assessment 0 9/29/2017  6:33 AM   Dressing Status Clean, dry, & intact; New 9/29/2017  6:33 AM   Dressing Type Tape;Transparent 9/29/2017  6:33 AM   Hub Color/Line Status Green 9/29/2017  6:33 AM            Airway - Endotracheal Tube 09/29/17 Oral (Active)                   Dressing/Packing:  Wound Abdomen Anterior; Left-DRESSING TYPE: Topical skin adhesive/glue;ABD binder (09/29/17 0900)  Splint/Cast:  ]    Other:

## 2017-09-29 NOTE — IP AVS SNAPSHOT
[address] [facility] [address] [place] [address] 
[phone] Patient: Sandra Mark MRN: [id number] QRS:5/6/5602 You are allergic to the following Allergen Reactions Sulfa (Sulfonamide Antibiotics) Hives And breathing problems Recent Documentation Height Weight BMI Smoking Status 1.956 m 145.2 kg 37.95 kg/m2 Never Smoker Emergency Contacts Name Discharge Info Relation Home Work Mobile [address] [facility] CAREGIVER [3] Parent [1] [phone] About your hospitalization You were admitted on:  September 29, 2017 You last received care in the:  [address] [place] [address] PACU You were discharged on:  September 29, 2017 Unit phone number:  [phone] Why you were hospitalized Your primary diagnosis was:  Not on File Providers Seen During Your Hospitalizations Provider Role Specialty Primary office phone Wilfrid Nathan MD Attending Provider General Surgery [phone] Your Primary Care Physician (PCP) Primary Care Physician Office Phone Office Fax [address] Follow-up Information Follow up With Details Comments Contact Info Sarah Pinto MD   [address] [facility] [address] 
[phone] Wilfrid Nathan MD Schedule an appointment as soon as possible for a visit in 2 weeks For wound re-check [address] [facility] [address] [place] [address] 
[phone] Your Appointments Monday October 16, 2017 11:00 AM EDT  
POST OP with ROMI Huston  
[facility] 22 814 ([address]) [facility] 63 [facility] Alois Stains [id number] [phone] Current Discharge Medication List  
  
START taking these medications Dose & Instructions Dispensing Information Comments Morning Noon Evening Bedtime  
 oxyCODONE-acetaminophen 5-325 mg per tablet Commonly known as:  PERCOCET Your last dose was: Your next dose is:    
   
   
 Dose:  1-2 Tab Take 1-2 Tabs by mouth every four (4) hours as needed for Pain. Max Daily Amount: 12 Tabs. Quantity:  40 Tab Refills:  0 CONTINUE these medications which have NOT CHANGED Dose & Instructions Dispensing Information Comments Morning Noon Evening Bedtime  
 allopurinol 300 mg tablet Commonly known as:  Heredia Bless Your last dose was: Your next dose is:    
   
   
 Dose:  300 mg  
300 mg daily. Refills:  0  
     
   
   
   
  
 amLODIPine-benazepril 10-40 mg per capsule Commonly known as:  Indy Shave Your last dose was: Your next dose is:    
   
   
 Dose:  1 Cap Take 1 Cap by mouth daily. Refills:  0  
     
   
   
   
  
 metFORMIN 500 mg tablet Commonly known as:  GLUCOPHAGE Your last dose was: Your next dose is:    
   
   
 Dose:  500 mg Take 500 mg by mouth two (2) times daily (with meals). TAKES 1/2 - 1 TAB DAILY DEPENDING ON PHYSICAL ACTIVITY Refills:  0  
     
   
   
   
  
 montelukast 10 mg tablet Commonly known as:  SINGULAIR Your last dose was: Your next dose is:    
   
   
 Dose:  10 mg Take 10 mg by mouth daily. Refills:  0  
     
   
   
   
  
 traZODone 50 mg tablet Commonly known as:  Jameel Noel Your last dose was: Your next dose is:    
   
   
 Dose:  50 mg Take 50 mg by mouth nightly. Refills:  0 Where to Get Your Medications Information on where to get these meds will be given to you by the nurse or doctor. ! Ask your nurse or doctor about these medications  
  oxyCODONE-acetaminophen 5-325 mg per tablet Discharge Instructions Ventral Hernia Repair Patient Discharge Instructions Tomer Hightower / [id number] : 1975 Admitted 2017 Discharged: 2017 · It is important that you take the medication exactly as they are prescribed. · Keep your medication in the bottles provided by the pharmacist and keep a list of the medication names, dosages, and times to be taken in your wallet. · Do not take other medications without consulting your doctor. · No heavy lifting for 4 wks post op · Wound care: none needed, you may shower at home starting tomorrow, do not remove the dermabond it will fall off on its own in 3 wks What to do at [facility] See detailed instructions below. Follow-up with Dr. Mahi Loza in 2 week(s). Call the office ([phone]) to schedule your appointment. Information obtained by : 
I understand that if any problems occur once I am at home I am to contact my physician. I understand and acknowledge receipt of the instructions indicated above. Physician's or R.N.'s Signature                                                                  Date/Time Patient or Representative Signature                                                          Date/Time PERCOCET 5MG/325MG GIVEN AT 1135AM 9-29-17 Abdominal Hernia Repair: What to Expect at Home Your Recovery After surgery to repair your hernia, you are likely to have pain for a few days. You may also feel like you have the flu, and you may have a low fever and feel tired and nauseated. This is common. You should feel better after a few days and will probably feel much better in 7 days. For several weeks you may feel twinges or pulling in the hernia repair when you move. You may have some bruising around the area of your hernia repair.  This is normal. 
 This care sheet gives you a general idea about how long it will take for you to recover. But each person recovers at a different pace. Follow the steps below to get better as quickly as possible. How can you care for yourself at home? Activity · Rest when you feel tired. Getting enough sleep will help you recover. Sleep with your head up by using three or four pillows. You can also try to sleep with your head up in a recliner chair. Do not sleep on your side or stomach. · Try to walk each day. Start by walking a little more than you did the day before. Bit by bit, increase the amount you walk. Walking boosts blood flow and helps prevent pneumonia and constipation. · Put ice or a cold pack on the area of your hernia repair for 10 to 20 minutes at a time. Try to do this every 1 to 2 hours for the first 24 hours (when you are awake) or until the swelling goes down. Put a thin cloth between the ice and your skin. · If your doctor gives you an abdominal binder to wear, use it as directed. This is an elastic bandage that wraps around your belly and upper hips. It helps support your belly muscles after surgery. · Avoid strenuous activities, such as biking, jogging, weight lifting, or aerobic exercise, until your doctor says it is okay. · Avoid lifting anything that would make you strain. This may include heavy grocery bags and milk containers, a heavy briefcase or backpack, cat litter or dog food bags, a vacuum , or a child. · Ask your doctor when you can drive again. · Most people are able to return to work within 1 to 2 weeks after surgery. But if your job requires that you to do heavy lifting or strenuous activity, you may need to take 4 to 6 weeks off from work. · You may shower 24 to 48 hours after surgery, if your doctor okays it. Pat the cut (incision) dry. Do not take a bath for the first 2 weeks, or until your doctor tells you it is okay. · Ask your doctor when it is okay for you to have sex. Diet 
· You can eat your normal diet. If your stomach is upset, try bland, low-fat foods like plain rice, broiled chicken, toast, and yogurt. · Drink plenty of fluids (unless your doctor tells you not to). · You may notice that your bowel movements are not regular right after your surgery. This is common. Avoid constipation and straining with bowel movements. You may want to take a fiber supplement every day. If you have not had a bowel movement after a couple of days, ask your doctor about taking a mild laxative. Medicines · Take pain medicines exactly as directed. ¨ If the doctor gave you a prescription medicine for pain, take it as prescribed. ¨ If you are not taking a prescription pain medicine, ask your doctor if you can take an over-the-counter medicine. ¨ Do not take two or more pain medicines at the same time unless the doctor told you to. Many pain medicines have acetaminophen, which is Tylenol. Too much acetaminophen (Tylenol) can be harmful. · If your doctor prescribed antibiotics, take them as directed. Do not stop taking them just because you feel better. You need to take the full course of antibiotics. · If you think your pain medicine is making you sick to your stomach: 
¨ Take your medicine after meals (unless your doctor has told you not to). ¨ Ask your doctor for a different pain medicine. Incision care · If you have strips of tape on the cut (incision) the doctor made, leave the tape on for a week or until it falls off. · If you have staples closing the cut, you will need to visit your doctor in 1 to 2 weeks to have them removed. · Wash the area daily with warm, soapy water, and pat it dry. Don't use hydrogen peroxide or alcohol, which may delay healing. You may cover the area with a gauze bandage if it weeps or rubs against clothing. Change the bandage every day. Other instructions · Hold a pillow over your incision when you cough or take deep breaths. This will support your belly and decrease your pain. · Do breathing exercises at home as instructed by your doctor. This will help prevent pneumonia. · If you had laparoscopic surgery, you may also have pain in your left shoulder. The pain usually lasts about a day or two. Follow-up care is a key part of your treatment and safety. Be sure to make and go to all appointments, and call your doctor if you are having problems. It's also a good idea to know your test results and keep a list of the medicines you take. When should you call for help? Call 911 anytime you think you may need emergency care. For example, call if: 
· You passed out (lost consciousness). · You have sudden chest pain and shortness of breath, or you cough up blood. · You have severe pain in your belly. Call your doctor now or seek immediate medical care if: 
· You are sick to your stomach and cannot keep fluids down. · You have signs of a blood clot, such as: 
¨ Pain in your calf, back of the knee, thigh, or groin. ¨ Redness and swelling in your leg or groin. · You have signs of infection, such as: 
¨ Increased pain, swelling, warmth, or redness. ¨ Red streaks leading from the incision. ¨ Pus draining from the incision. ¨ Swollen lymph nodes in the groin. ¨ A fever. · You have trouble passing urine or stool, especially if you have mild pain or swelling in your lower belly. · Bright red blood has soaked through the bandage over your incision. Watch closely for changes in your health, and be sure to contact your doctor if: 
· Your swelling is getting worse. · Your swelling is not going down. · You still don't have a bowel movement after taking a laxative. Where can you learn more? Go to I.Predictus.be Enter B577 in the search box to learn more about \"Abdominal Hernia Repair: What to Expect at Home. \"  
© 9027-5201 Healthwise, [employer].  Care instructions adapted under license by [employer] (which disclaims liability or warranty for this information). This care instruction is for use with your licensed healthcare professional. If you have questions about a medical condition or this instruction, always ask your healthcare professional. Kei Sandip any warranty or liability for your use of this information. Content Version: 8.8.80536; Last Revised: August 24, 2010 DISCHARGE SUMMARY from Nurse The following personal items are in your possession at time of discharge: 
 
Dental Appliances: None Visual Aid: Glasses Home Medications: None Jewelry: None Clothing: Other (comment) (CLOTHING & SHOES TO PACU) Other Valuables: Eyeglasses (GLASSES TO PACU) PATIENT INSTRUCTIONS: 
 
After general anesthesia or intravenous sedation, for 24 hours or while taking prescription Narcotics: · Limit your activities · Do not drive and operate hazardous machinery · Do not make important personal or business decisions · Do  not drink alcoholic beverages · If you have not urinated within 8 hours after discharge, please contact your surgeon on call. Report the following to your surgeon: 
· Excessive pain, swelling, redness or odor of or around the surgical area · Temperature over 100.5 · Nausea and vomiting lasting longer than 4 hours or if unable to take medications · Any signs of decreased circulation or nerve impairment to extremity: change in color, persistent  numbness, tingling, coldness or increase pain · Any questions Zander Plough These are general instructions for a healthy lifestyle: No smoking/ No tobacco products/ Avoid exposure to second hand smoke Surgeon General's Warning:  Quitting smoking now greatly reduces serious risk to your health. Obesity, smoking, and sedentary lifestyle greatly increases your risk for illness A healthy diet, regular physical exercise & weight monitoring are important for maintaining a healthy lifestyle You may be retaining fluid if you have a history of heart failure or if you experience any of the following symptoms:  Weight gain of 3 pounds or more overnight or 5 pounds in a week, increased swelling in our hands or feet or shortness of breath while lying flat in bed. Please call your doctor as soon as you notice any of these symptoms; do not wait until your next office visit. Recognize signs and symptoms of STROKE: 
 
F-face looks uneven A-arms unable to move or move unevenly S-speech slurred or non-existent T-time-call 911 as soon as signs and symptoms begin-DO NOT go Back to bed or wait to see if you get better-TIME IS BRAIN. Warning Signs of HEART ATTACK Call 911 if you have these symptoms: 
? Chest discomfort. Most heart attacks involve discomfort in the center of the chest that lasts more than a few minutes, or that goes away and comes back. It can feel like uncomfortable pressure, squeezing, fullness, or pain. ? Discomfort in other areas of the upper body. Symptoms can include pain or discomfort in one or both arms, the back, neck, jaw, or stomach. ? Shortness of breath with or without chest discomfort. ? Other signs may include breaking out in a cold sweat, nausea, or lightheadedness. Don't wait more than five minutes to call [address]! Fast action can save your life. Calling 911 is almost always the fastest way to get lifesaving treatment. Emergency Medical Services staff can begin treatment when they arrive  up to an hour sooner than if someone gets to the hospital by car. The discharge information has been reviewed with the {PATIENT PARENT GUARDIAN:23507}. The {PATIENT PARENT GUARDIAN:39406} verbalized understanding. Discharge medications reviewed with the {Dishcarge meds reviewed GJXW:78871} and appropriate educational materials and side effects teaching were provided. Discharge Instructions Attachments/References MEFS - OXYCODONE/ACETAMINOPHEN (PERCOCET, ROXICET) - (BY MOUTH) (ENGLISH) Discharge Orders None Introducing [facility]! Rae Velasco introduces Caravan patient portal. Now you can access parts of your medical record, email your doctor's office, and request medication refills online. 1. In your internet browser, go to [email or web address] 2. Click on the First Time User? Click Here link in the Sign In box. You will see the New Member Sign Up page. 3. Enter your Caravan Access Code exactly as it appears below. You will not need to use this code after youve completed the sign-up process. If you do not sign up before the expiration date, you must request a new code. · Caravan Access Code: [email or web address] Expires: 12/18/2017  2:20 PM 
 
4. Enter the last four digits of your Social Security Number (xxxx) and Date of Birth (mm/dd/yyyy) as indicated and click Submit. You will be taken to the next sign-up page. 5. Create a Caravan ID. This will be your Caravan login ID and cannot be changed, so think of one that is secure and easy to remember. 6. Create a Caravan password. You can change your password at any time. 7. Enter your Password Reset Question and Answer. This can be used at a later time if you forget your password. 8. Enter your e-mail address. You will receive e-mail notification when new information is available in [address]. 9. Click Sign Up. You can now view and download portions of your medical record. 10. Click the Download Summary menu link to download a portable copy of your medical information. If you have questions, please visit the Frequently Asked Questions section of the Caravan website. Remember, Caravan is NOT to be used for urgent needs. For medical emergencies, dial 911. Now available from your iPhone and Android! General Information Please provide this summary of care documentation to your next provider. Patient Signature:  ____________________________________________________________ Date:  ____________________________________________________________  
  
[place]ac [place] Provider Signature:  ____________________________________________________________ Date:  ____________________________________________________________ More Information Oxycodone/Acetaminophen (Percocet, Roxicet) - (By mouth) Why this medicine is used:  
Treats pain. This medicine contains a narcotic pain reliever. Contact a nurse or doctor right away if you have: 
· Extreme weakness, shallow breathing, slow heartbeat · Sweating or cold, clammy skin · Skin blisters, rash, or peeling Common side effects: 
· Constipation · Nausea, vomiting · Tiredness © 2017 [address] Information is for End User's use only and may not be sold, redistributed or otherwise used for commercial purposes.

## 2017-09-29 NOTE — ANESTHESIA PREPROCEDURE EVALUATION
Anesthetic History   No history of anesthetic complications            Review of Systems / Medical History  Patient summary reviewed, nursing notes reviewed and pertinent labs reviewed    Pulmonary  Within defined limits                 Neuro/Psych   Within defined limits           Cardiovascular    Hypertension              Exercise tolerance: >4 METS     GI/Hepatic/Renal  Within defined limits              Endo/Other    Diabetes         Other Findings              Physical Exam    Airway  Mallampati: II  TM Distance: 4 - 6 cm  Neck ROM: normal range of motion   Mouth opening: Normal     Cardiovascular  Regular rate and rhythm,  S1 and S2 normal,  no murmur, click, rub, or gallop             Dental  No notable dental hx       Pulmonary  Breath sounds clear to auscultation               Abdominal  GI exam deferred       Other Findings            Anesthetic Plan    ASA: 2  Anesthesia type: general          Induction: Intravenous  Anesthetic plan and risks discussed with: Patient

## 2017-09-29 NOTE — DISCHARGE INSTRUCTIONS
Ventral Hernia Repair    Patient Discharge Instructions    Jovanna Patel / [id number] : 1975    Admitted 2017 Discharged: 2017       · It is important that you take the medication exactly as they are prescribed. · Keep your medication in the bottles provided by the pharmacist and keep a list of the medication names, dosages, and times to be taken in your wallet. · Do not take other medications without consulting your doctor. · No heavy lifting for 4 wks post op  · Wound care: none needed, you may shower at home starting tomorrow, do not remove the dermabond it will fall off on its own in 3 wks      What to do at Home    See detailed instructions below. Follow-up with Dr. Gildardo Garcia in 2 week(s). Call the office ([phone]) to schedule your appointment. Information obtained by :  I understand that if any problems occur once I am at home I am to contact my physician. I understand and acknowledge receipt of the instructions indicated above. Physician's or R.N.'s Signature                                                                  Date/Time                                                                                                                                              Patient or Representative Signature                                                          Date/Time     PERCOCET 5MG/325MG GIVEN AT 1135AM 17    Abdominal Hernia Repair: What to Expect at [address] [facility]  After surgery to repair your hernia, you are likely to have pain for a few days. You may also feel like you have the flu, and you may have a low fever and feel tired and nauseated. This is common. You should feel better after a few days and will probably feel much better in 7 days.   For several weeks you may feel twinges or pulling in the hernia repair when you move. You may have some bruising around the area of your hernia repair. This is normal.  This care sheet gives you a general idea about how long it will take for you to recover. But each person recovers at a different pace. Follow the steps below to get better as quickly as possible. How can you care for yourself at home? Activity  · Rest when you feel tired. Getting enough sleep will help you recover. Sleep with your head up by using three or four pillows. You can also try to sleep with your head up in a recliner chair. Do not sleep on your side or stomach. · Try to walk each day. Start by walking a little more than you did the day before. Bit by bit, increase the amount you walk. Walking boosts blood flow and helps prevent pneumonia and constipation. · Put ice or a cold pack on the area of your hernia repair for 10 to 20 minutes at a time. Try to do this every 1 to 2 hours for the first 24 hours (when you are awake) or until the swelling goes down. Put a thin cloth between the ice and your skin. · If your doctor gives you an abdominal binder to wear, use it as directed. This is an elastic bandage that wraps around your belly and upper hips. It helps support your belly muscles after surgery. · Avoid strenuous activities, such as biking, jogging, weight lifting, or aerobic exercise, until your doctor says it is okay. · Avoid lifting anything that would make you strain. This may include heavy grocery bags and milk containers, a heavy briefcase or backpack, cat litter or dog food bags, a vacuum , or a child. · Ask your doctor when you can drive again. · Most people are able to return to work within 1 to 2 weeks after surgery. But if your job requires that you to do heavy lifting or strenuous activity, you may need to take 4 to 6 weeks off from work. · You may shower 24 to 48 hours after surgery, if your doctor okays it. Pat the cut (incision) dry.  Do not take a bath for the first 2 weeks, or until your doctor tells you it is okay. · Ask your doctor when it is okay for you to have sex. Diet  · You can eat your normal diet. If your stomach is upset, try bland, low-fat foods like plain rice, broiled chicken, toast, and yogurt. · Drink plenty of fluids (unless your doctor tells you not to). · You may notice that your bowel movements are not regular right after your surgery. This is common. Avoid constipation and straining with bowel movements. You may want to take a fiber supplement every day. If you have not had a bowel movement after a couple of days, ask your doctor about taking a mild laxative. Medicines  · Take pain medicines exactly as directed. ¨ If the doctor gave you a prescription medicine for pain, take it as prescribed. ¨ If you are not taking a prescription pain medicine, ask your doctor if you can take an over-the-counter medicine. ¨ Do not take two or more pain medicines at the same time unless the doctor told you to. Many pain medicines have acetaminophen, which is Tylenol. Too much acetaminophen (Tylenol) can be harmful. · If your doctor prescribed antibiotics, take them as directed. Do not stop taking them just because you feel better. You need to take the full course of antibiotics. · If you think your pain medicine is making you sick to your stomach:  ¨ Take your medicine after meals (unless your doctor has told you not to). ¨ Ask your doctor for a different pain medicine. Incision care  · If you have strips of tape on the cut (incision) the doctor made, leave the tape on for a week or until it falls off. · If you have staples closing the cut, you will need to visit your doctor in 1 to 2 weeks to have them removed. · Wash the area daily with warm, soapy water, and pat it dry. Don't use hydrogen peroxide or alcohol, which may delay healing. You may cover the area with a gauze bandage if it weeps or rubs against clothing. Change the bandage every day.   Other instructions  · Hold a pillow over your incision when you cough or take deep breaths. This will support your belly and decrease your pain. · Do breathing exercises at home as instructed by your doctor. This will help prevent pneumonia. · If you had laparoscopic surgery, you may also have pain in your left shoulder. The pain usually lasts about a day or two. Follow-up care is a key part of your treatment and safety. Be sure to make and go to all appointments, and call your doctor if you are having problems. It's also a good idea to know your test results and keep a list of the medicines you take. When should you call for help? Call 911 anytime you think you may need emergency care. For example, call if:  · You passed out (lost consciousness). · You have sudden chest pain and shortness of breath, or you cough up blood. · You have severe pain in your belly. Call your doctor now or seek immediate medical care if:  · You are sick to your stomach and cannot keep fluids down. · You have signs of a blood clot, such as:  ¨ Pain in your calf, back of the knee, thigh, or groin. ¨ Redness and swelling in your leg or groin. · You have signs of infection, such as:  ¨ Increased pain, swelling, warmth, or redness. ¨ Red streaks leading from the incision. ¨ Pus draining from the incision. ¨ Swollen lymph nodes in the groin. ¨ A fever. · You have trouble passing urine or stool, especially if you have mild pain or swelling in your lower belly. · Bright red blood has soaked through the bandage over your incision. Watch closely for changes in your health, and be sure to contact your doctor if:  · Your swelling is getting worse. · Your swelling is not going down. · You still don't have a bowel movement after taking a laxative. Where can you learn more? Go to Meusonic.be  Enter B577 in the search box to learn more about \"Abdominal Hernia Repair: What to Expect at Home. \"   © 1766-4607 Healthwise, Incorporated. Care instructions adapted under license by [facility] (which disclaims liability or warranty for this information). This care instruction is for use with your licensed healthcare professional. If you have questions about a medical condition or this instruction, always ask your healthcare professional. Kim Cabrales any warranty or liability for your use of this information. Content Version: 8.8.61932; Last Revised: August 24, 2010            DISCHARGE SUMMARY from Nurse    The following personal items are in your possession at time of discharge:    Dental Appliances: None  Visual Aid: Glasses     Home Medications: None  Jewelry: None  Clothing: Other (comment) (CLOTHING & SHOES TO PACU)  Other Valuables: Eyeglasses (GLASSES TO PACU)             PATIENT INSTRUCTIONS:    After general anesthesia or intravenous sedation, for 24 hours or while taking prescription Narcotics:  · Limit your activities  · Do not drive and operate hazardous machinery  · Do not make important personal or business decisions  · Do  not drink alcoholic beverages  · If you have not urinated within 8 hours after discharge, please contact your surgeon on call. Report the following to your surgeon:  · Excessive pain, swelling, redness or odor of or around the surgical area  · Temperature over 100.5  · Nausea and vomiting lasting longer than 4 hours or if unable to take medications  · Any signs of decreased circulation or nerve impairment to extremity: change in color, persistent  numbness, tingling, coldness or increase pain  · Any questions      . These are general instructions for a healthy lifestyle:    No smoking/ No tobacco products/ Avoid exposure to second hand smoke    Surgeon General's Warning:  Quitting smoking now greatly reduces serious risk to your health.     Obesity, smoking, and sedentary lifestyle greatly increases your risk for illness    A healthy diet, regular physical exercise & weight monitoring are important for maintaining a healthy lifestyle    You may be retaining fluid if you have a history of heart failure or if you experience any of the following symptoms:  Weight gain of 3 pounds or more overnight or 5 pounds in a week, increased swelling in our hands or feet or shortness of breath while lying flat in bed. Please call your doctor as soon as you notice any of these symptoms; do not wait until your next office visit. Recognize signs and symptoms of STROKE:    F-face looks uneven    A-arms unable to move or move unevenly    S-speech slurred or non-existent    T-time-call 911 as soon as signs and symptoms begin-DO NOT go       Back to bed or wait to see if you get better-TIME IS BRAIN. Warning Signs of HEART ATTACK     Call 911 if you have these symptoms:   Chest discomfort. Most heart attacks involve discomfort in the center of the chest that lasts more than a few minutes, or that goes away and comes back. It can feel like uncomfortable pressure, squeezing, fullness, or pain.  Discomfort in other areas of the upper body. Symptoms can include pain or discomfort in one or both arms, the back, neck, jaw, or stomach.  Shortness of breath with or without chest discomfort.  Other signs may include breaking out in a cold sweat, nausea, or lightheadedness. Don't wait more than five minutes to call 911 - MINUTES MATTER! Fast action can save your life. Calling 911 is almost always the fastest way to get lifesaving treatment. Emergency Medical Services staff can begin treatment when they arrive -- up to an hour sooner than if someone gets to the hospital by car. The discharge information has been reviewed with the {PATIENT PARENT GUARDIAN:76686}. The {PATIENT PARENT GUARDIAN:33295} verbalized understanding. Discharge medications reviewed with the {Dishcarge meds reviewed UTVA:98445} and appropriate educational materials and side effects teaching were provided.

## 2017-09-29 NOTE — H&P (VIEW-ONLY)
[facility] History and Physical    History of Present Illness:      Glory Dominguez is a 38 y.o. male who has a recurrent umbilical hernia. He had a previous open umbilical hernia in [facility] repaired open without mesh. He has been having some increasing pain over the last few months. He has noticed the bulge just above the umbilicus getting bigger. The pain is a 3 of 10. He does not have any nausea, vomiting, or changes in BM. Past Medical History:   Diagnosis Date    Asthma     Diabetes ([facility] [address]     Hypertension        Past Surgical History:   Procedure Laterality Date    HX HERNIA REPAIR      HX PARATHYROIDECTOMY           Current Outpatient Prescriptions:     allopurinol (ZYLOPRIM) 300 mg tablet, , Disp: , Rfl:     amLODIPine-benazepril (LOTREL) 10-40 mg per capsule, , Disp: , Rfl:     traZODone (DESYREL) 50 mg tablet, , Disp: , Rfl:     metFORMIN (GLUCOPHAGE) 500 mg tablet, , Disp: , Rfl:     montelukast (SINGULAIR) 10 mg tablet, , Disp: , Rfl:     Allergies   Allergen Reactions    Sulfa (Sulfonamide Antibiotics) Hives     And breathing problems       Social History     Social History    Marital status:      Spouse name: N/A    Number of children: N/A    Years of education: N/A     Occupational History    Not on file.      Social History Main Topics    Smoking status: Never Smoker    Smokeless tobacco: Never Used    Alcohol use Yes    Drug use: No    Sexual activity: Not on file     Other Topics Concern    Not on file     Social History Narrative       Family History   Problem Relation Age of Onset    Heart Disease Father     Hypertension Father     Diabetes Maternal Grandmother     Cancer Maternal Grandfather        ROS   Constitutional: negative  Ears, Nose, Mouth, Throat, and Face: negative  Respiratory: negative  Cardiovascular: negative  Gastrointestinal: positive for abdominal pain  Genitourinary:negative  Integument/Breast: negative  Hematologic/Lymphatic: negative  Behavioral/Psychiatric: negative  Allergic/Immunologic: negative      Physical Exam:     Visit Vitals    /84    Pulse 82    Temp 99.1 °F (37.3 °C)    Resp 18    Ht 6' 4\" (1.93 m)    Wt 318 lb (144.2 kg)    SpO2 98%    BMI 38.71 kg/m2       General - alert and oriented, no apparent distress  HEENT - no jaundice, no hearing imparement  Pulm - CTAB, no C/W/R  CV - RRR, no M/R/G  Abd - soft, ND, BS present, umbilical hernia present just above the umbilicus, mild TTP, nonreducible, feels like fatty tissue in hernia, no erythema  Ext - pulses intact in UE and LE bilaterally, no edema  Skin - supple, no rashes  Psychiatric - normal affect, good mood    Labs  none    Imaging  CT from [facility] - 9.6II umbilical hernia present with fat in the defect  I have reviewed and agree with all of the pertinent images    Assessment:     Bon Lewis is a 38 y.o. male with recurrent incarcerated umbilical hernia    Recommendations:     1. He will need robotic recurrent umbilical hernia repair with mesh the OR. I have discussed the above procedure with the patient in detail. We reviewed the benefits and possible complications of the surgery which include bleeding, infection, damage to adjacent organs, venous thromboembolism, need for repeat surgery, death and other unforseen complications. The patient agreed to proceed with the surgery. Lien Guillory MD    Mr. Joana Beverly has a reminder for a \"due or due soon\" health maintenance. I have asked that he contact his primary care provider for follow-up on this health maintenance.

## 2017-10-16 ENCOUNTER — OFFICE VISIT (OUTPATIENT)
Dept: SURGERY | Age: 42
End: 2017-10-16

## 2017-10-16 VITALS
HEART RATE: 92 BPM | BODY MASS INDEX: 37.19 KG/M2 | SYSTOLIC BLOOD PRESSURE: 130 MMHG | WEIGHT: 315 LBS | TEMPERATURE: 97.7 F | HEIGHT: 77 IN | RESPIRATION RATE: 20 BRPM | OXYGEN SATURATION: 96 % | DIASTOLIC BLOOD PRESSURE: 90 MMHG

## 2017-10-16 DIAGNOSIS — E66.01 SEVERE OBESITY (BMI 35.0-39.9): ICD-10-CM

## 2017-10-16 DIAGNOSIS — Z09 POSTOPERATIVE EXAMINATION: Primary | ICD-10-CM

## 2017-10-16 NOTE — MR AVS SNAPSHOT
Visit Information Date & Time Provider Department Dept. Phone Encounter #  
 10/16/2017 11:00 AM ROMI Ordaz [place] [address] [phone] [id number] Follow-up Instructions Return if symptoms worsen or fail to improve. Upcoming Health Maintenance Date Due DTaP/Tdap/Td series (1 - Tdap) 4/3/1998 INFLUENZA AGE 9 TO ADULT 8/1/2017 Allergies as of 10/16/2017  Review Complete On: 10/16/2017 By: ROMI Ordaz Severity Noted Reaction Type Reactions Sulfa (Sulfonamide Antibiotics)  09/19/2017    Hives And breathing problems Current Immunizations  Never Reviewed No immunizations on file. Not reviewed this visit You Were Diagnosed With   
  
 Codes Comments Postoperative examination    -  Primary ICD-10-CM: D85 ICD-9-CM: V67.00 Severe obesity (BMI 35.0-39.9) (HCC)     ICD-10-CM: E66.01 
ICD-9-CM: 278.01 Vitals BP Pulse Temp Resp Height(growth percentile) Weight(growth percentile) 130/90 92 97.7 °F (36.5 °C) 20 6' 5\" (1.956 m) 320 lb (145.2 kg) SpO2 BMI Smoking Status 96% 37.95 kg/m2 Never Smoker BMI and BSA Data Body Mass Index Body Surface Area  
 37.95 kg/m 2 2.81 m 2 Your Updated Medication List  
  
   
This list is accurate as of: 10/16/17 11:54 AM.  Always use your most recent med list.  
  
  
  
  
 allopurinol 300 mg tablet Commonly known as:  ZYLOPRIM  
300 mg daily. amLODIPine-benazepril 10-40 mg per capsule Commonly known as:  Kathrynn Mode Take 1 Cap by mouth daily. metFORMIN 500 mg tablet Commonly known as:  GLUCOPHAGE Take 500 mg by mouth two (2) times daily (with meals). TAKES 1/2 - 1 TAB DAILY DEPENDING ON PHYSICAL ACTIVITY  
  
 montelukast 10 mg tablet Commonly known as:  SINGULAIR Take 10 mg by mouth daily. traZODone 50 mg tablet Commonly known as:  Benji Karson Take 50 mg by mouth nightly. We Performed the Following REFERRAL TO WEIGHT LOSS [WZA637 Custom] Comments:  
 Body mass index is 37.95 kg/(m^2). Medical weight loss Follow-up Instructions Return if symptoms worsen or fail to improve. Referral Information Referral ID Referred By Referred To  
  
 [id number] Judy Armenta MD   
   [address] [facility] [place], [address] [place] [address] Phone: [phone] Fax: [phone] Visits Status Start Date End Date 1 New Request 10/16/17 10/16/18 If your referral has a status of pending review or denied, additional information will be sent to support the outcome of this decision. Patient Instructions Activity:  You may swim in a pool. Continue to avoid heavy lifting, pushing or pulling > 15lbs. No abdominal exercises. You may drive. Please continue your walking and other activities of daily living. Walking for Exercise: Care Instructions Your Care Instructions Walking is one of the easiest ways to get the exercise you need for good health. A brisk, 30-minute walk each day can help you feel better and have more energy. It can help you lower your risk of disease. Walking can help you keep your bones strong and your heart healthy. Check with your doctor before you start a walking plan if you have heart problems, other health issues, or you have not been active in a long time. Follow your doctor's instructions for safe levels of exercise. Follow-up care is a key part of your treatment and safety. Be sure to make and go to all appointments, and call your doctor if you are having problems. It's also a good idea to know your test results and keep a list of the medicines you take. How can you care for yourself at home? Getting started · Start slowly and set a short-term goal. For example, walk for 5 or 10 minutes every day. · Bit by bit, increase the amount you walk every day.  Try for at least 30 minutes on most days of the week. You also may want to swim, bike, or do other activities. · If finding enough time is a problem, it is fine to be active in blocks of 10 minutes or more throughout your day and week. · To get the heart-healthy benefits of walking, you need to walk briskly enough to increase your heart rate and breathing, but not so fast that you cannot talk comfortably. · Wear comfortable shoes that fit well and provide good support for your feet and ankles. Staying with your plan · After you've made walking a habit, set a longer-term goal. You may want to set a goal of walking briskly for longer or walking farther. Experts say to do 2½ hours of moderate activity a week. A faster heartbeat is what defines moderate-level activity. · To stay motivated, walk with friends, coworkers, or pets. · Use a phone shant or pedometer to track your steps each day. Set a goal to increase your steps. Once you get there, set a higher goal. Aim for 10,000 steps a day. · If the weather keeps you from walking outside, go for walks at the mall with a friend. Local schools and churches may have indoor gyms where you can walk. Fitting a walk into your workday · Park several blocks away from work, or get off the bus a few stops early. · Use the stairs instead of the elevator, at least for a few floors. · Suggest holding meetings with colleagues during a walk inside or outside the building. · Use the restroom that is the farthest from your desk or workstation. · Use your morning and afternoon breaks to take quick 15-minute walks. Staying safe · Know your surroundings. Walk in a well-lighted, safe place. If it is dark, walk with a partner. Wear light-colored clothing. If you can, buy a vest or jacket that reflects light. · Carry a cell phone for emergencies. · Drink plenty of water. Take a water bottle with you when you walk. This is very important if it is hot out. · Be careful not to slip on wet or icy ground. You can buy \"grippers\" for your shoes to help keep you from slipping. · Pay attention to your walking surface. Use sidewalks and paths. · If you have breathing problems like asthma or COPD, ask your doctor when it is safe for you to walk outdoors. Cold, dry air, smog, pollen, or other things in the air could cause breathing problems. Where can you learn more? Go to http://renetta-mazin.info/. Enter R159 in the search box to learn more about \"Walking for Exercise: Care Instructions. \" Current as of: March 13, 2017 Content Version: 11.3 © 4458-7840 AgeCheq. Care instructions adapted under license by [employer] (which disclaims liability or warranty for this information). If you have questions about a medical condition or this instruction, always ask your healthcare professional. [address] any warranty or liability for your use of this information. Introducing [facility]! Zari Floyd introduces VeraLight patient portal. Now you can access parts of your medical record, email your doctor's office, and request medication refills online. 1. In your internet browser, go to [email or web address] 2. Click on the First Time User? Click Here link in the Sign In box. You will see the New Member Sign Up page. 3. Enter your VeraLight Access Code exactly as it appears below. You will not need to use this code after youve completed the sign-up process. If you do not sign up before the expiration date, you must request a new code. · VeraLight Access Code: [email or web address] Expires: 12/18/2017  2:20 PM 
 
4. Enter the last four digits of your Social Security Number (xxxx) and Date of Birth (mm/dd/yyyy) as indicated and click Submit. You will be taken to the next sign-up page. 5. Create a VeraLight ID.  This will be your VeraLight login ID and cannot be changed, so think of one that is secure and easy to remember. 6. Create a [email or web address] password. You can change your password at any time. 7. Enter your Password Reset Question and Answer. This can be used at a later time if you forget your password. 8. Enter your e-mail address. You will receive e-mail notification when new information is available in [address] 9. Click Sign Up. You can now view and download portions of your medical record. 10. Click the Download Summary menu link to download a portable copy of your medical information. If you have questions, please visit the Frequently Asked Questions section of the [email or web address] website. Remember, [email or web address] is NOT to be used for urgent needs. For medical emergencies, dial 911. Now available from your iPhone and Android! Please provide this summary of care documentation to your next provider. Your primary care clinician is listed as [facility]. If you have any questions after today's visit, please call [phone].

## 2017-10-16 NOTE — PROGRESS NOTES
1. Have you been to the ER, urgent care clinic since your last visit? Hospitalized since your last visit? no    2. Have you seen or consulted any other health care providers outside of the [facility] since your last visit? Include any pap smears or colon screening. no

## 2017-10-16 NOTE — PATIENT INSTRUCTIONS
Activity:  You may swim in a pool. Continue to avoid heavy lifting, pushing or pulling > 15lbs. No abdominal exercises. You may drive. Please continue your walking and other activities of daily living. Walking for Exercise: Care Instructions  Your Care Instructions    Walking is one of the easiest ways to get the exercise you need for good health. A brisk, 30-minute walk each day can help you feel better and have more energy. It can help you lower your risk of disease. Walking can help you keep your bones strong and your heart healthy. Check with your doctor before you start a walking plan if you have heart problems, other health issues, or you have not been active in a long time. Follow your doctor's instructions for safe levels of exercise. Follow-up care is a key part of your treatment and safety. Be sure to make and go to all appointments, and call your doctor if you are having problems. It's also a good idea to know your test results and keep a list of the medicines you take. How can you care for yourself at home? Getting started  · Start slowly and set a short-term goal. For example, walk for 5 or 10 minutes every day. · Bit by bit, increase the amount you walk every day. Try for at least 30 minutes on most days of the week. You also may want to swim, bike, or do other activities. · If finding enough time is a problem, it is fine to be active in blocks of 10 minutes or more throughout your day and week. · To get the heart-healthy benefits of walking, you need to walk briskly enough to increase your heart rate and breathing, but not so fast that you cannot talk comfortably. · Wear comfortable shoes that fit well and provide good support for your feet and ankles. Staying with your plan  · After you've made walking a habit, set a longer-term goal. You may want to set a goal of walking briskly for longer or walking farther. Experts say to do 2½ hours of moderate activity a week.  A faster heartbeat is what defines moderate-level activity. · To stay motivated, walk with friends, coworkers, or pets. · Use a phone shant or pedometer to track your steps each day. Set a goal to increase your steps. Once you get there, set a higher goal. Aim for 10,000 steps a day. · If the weather keeps you from walking outside, go for walks at the mall with a friend. Local schools and churches may have indoor gyms where you can walk. Fitting a walk into your workday  · Park several blocks away from work, or get off the bus a few stops early. · Use the stairs instead of the elevator, at least for a few floors. · Suggest holding meetings with colleagues during a walk inside or outside the building. · Use the restroom that is the farthest from your desk or workstation. · Use your morning and afternoon breaks to take quick 15-minute walks. Staying safe  · Know your surroundings. Walk in a well-lighted, safe place. If it is dark, walk with a partner. Wear light-colored clothing. If you can, buy a vest or jacket that reflects light. · Carry a cell phone for emergencies. · Drink plenty of water. Take a water bottle with you when you walk. This is very important if it is hot out. · Be careful not to slip on wet or icy ground. You can buy \"grippers\" for your shoes to help keep you from slipping. · Pay attention to your walking surface. Use sidewalks and paths. · If you have breathing problems like asthma or COPD, ask your doctor when it is safe for you to walk outdoors. Cold, dry air, smog, pollen, or other things in the air could cause breathing problems. Where can you learn more? Go to http://renetta-mazin.info/. Enter R159 in the search box to learn more about \"Walking for Exercise: Care Instructions. \"  Current as of: March 13, 2017  Content Version: 11.3  © 9307-4916 [employer].  Care instructions adapted under license by [employer] (which disclaims liability or warranty for this information). If you have questions about a medical condition or this instruction, always ask your healthcare professional. [place] [address] any warranty or liability for your use of this information.

## 2017-10-16 NOTE — PROGRESS NOTES
Subjective:      Sid Christianson is a 38 y.o. male presents for postop care 2.5 weeks following Laparoscopic robotic-assisted recurrent Umbilical hernia repair with mesh by Dr. Juanis Lees on 9/29/2017. Appetite is normal. Eating a regular diet without difficulty. He denies fevers or chills, but continues to feel tired, and this is getting better. Bowel movements are regular, and he is voiding without difficulty. No NVD. The patient is not having any pain. He continues to wear his abdominal binder when sitting at his desk at work and at bedtime. Mr. Joey Gallegos has returned to work with limited lifting. He requests more information regarding meeting with Dr. Axel Sidhu, for medical weight loss and is given her information. Objective:     Visit Vitals    /90    Pulse 92    Temp 97.7 °F (36.5 °C)    Resp 20    Ht 6' 5\" (1.956 m)    Wt 320 lb (145.2 kg)    SpO2 96%    BMI 37.95 kg/m2       General:  alert, cooperative, no distress, appears stated age   Abdomen: soft, bowel sounds active, non-tender, no hernias   Incisions:   healing well, no drainage, no erythema, no hernia, no seroma, no swelling, no dehiscence, incisions well approximated     Assessment:     Doing well postoperatively. Plan:     1. Continue any current medications. 2. Wound care & activity restrictions discussed. 3. He may increase activities as tolerated. Mr. Joey Gallegos verbalized understanding and questions were answered to the best of my knowledge and ability. Walking for exercise educational materials were provided.

## 2017-11-02 ENCOUNTER — OFFICE VISIT (OUTPATIENT)
Dept: SURGERY | Age: 42
End: 2017-11-02

## 2017-11-02 VITALS
SYSTOLIC BLOOD PRESSURE: 130 MMHG | BODY MASS INDEX: 37.19 KG/M2 | OXYGEN SATURATION: 98 % | TEMPERATURE: 98.9 F | HEART RATE: 94 BPM | DIASTOLIC BLOOD PRESSURE: 90 MMHG | HEIGHT: 77 IN | WEIGHT: 315 LBS | RESPIRATION RATE: 20 BRPM

## 2017-11-02 DIAGNOSIS — Z09 POSTOPERATIVE EXAMINATION: Primary | ICD-10-CM

## 2017-11-02 NOTE — LETTER
NOTIFICATION RETURN TO WORK / SCHOOL 
 
11/2/2017 10:55 AM 
 
Mr. Aden Moy [address] [id number] [facility] [id number] To Whom It May Concern: 
 
Aden Moy is currently under the care of [facility]. Please excuse him from work on 11/2/2017. He had a doctors appointment. If there are questions or concerns please have the patient contact our office. Sincerely, Evelyne Hoang NP

## 2017-11-02 NOTE — PROGRESS NOTES
Subjective:      Jerry Miller is a 38 y.o. male presents for postop care 5 weeks status post umbilical hernia repair. He present today because of drainage from his umbilicus. No complaints of pain. He states that he has started getting back to his normal workout schedule and working. He noticed this past week that the more does the area at his umbilicus drains more. The drainage has been yellow/brown. No odor. No redness. Patient has an advanced directive: not on file. Mr. Luiza Garcia has a reminder for a \"due or due soon\" health maintenance. I have asked that he contact his primary care provider for follow-up on this health maintenance. Objective:     Visit Vitals    /90 (BP 1 Location: Left arm, BP Patient Position: Sitting)    Pulse 94    Temp 98.9 °F (37.2 °C) (Oral)    Resp 20    Ht 6' 5\" (1.956 m)    Wt 320 lb (145.2 kg)    SpO2 98%    BMI 37.95 kg/m2       General:  alert, cooperative, no distress   Abdomen: soft, non-tender   Incision:   healing well, no swelling, yellow/brown drainage from the umbilicus, scant amount, no dehiscence, incision well approximated     Assessment:     Doing well postoperatively. Plan:     1. Advised patient to monitor amount of drainage. Possibly draining seroma. 2. Call if area continues to drain in 1-2 weeks or he notices redness or odor  3. Continue to increase activities as tolerated. Pt verbalized understanding and questions were answered to the best of my knowledge and ability.

## 2017-11-02 NOTE — PATIENT INSTRUCTIONS
Seroma: Care Instructions  Your Care Instructions  After a surgery, fluid can collect under the skin near the cut the doctor made (incision). This soft, puffy area is called a seroma. It can be tender to touch. The incision may even have opened up. Some seromas get better on their own. But when there is a lot of fluid under the skin, a seroma is drained to help the area heal.  If your incision has opened up, it may either be packed with gauze or left open to heal. To prevent infection, make sure to keep the area clean and to take all medicines as prescribed. Follow-up care is a key part of your treatment and safety. Be sure to make and go to all appointments, and call your doctor if you are having problems. It's also a good idea to know your test results and keep a list of the medicines you take. How can you care for yourself at home? · Follow your doctor's instructions for seroma care. If you have a drain tube, your doctor will tell you how to take care of it. · Look at the incision every day. Keep the area clean and dry. · Do not bathe unless you can keep the incision dry. Start with sponge baths. Ask your doctor when it is safe to shower. · Do not scrub or rub the incision. And don't wear clothing that rubs it. · Leave any tape strips (such as Steri-Strips) on your incision. They will fall off on their own, or your doctor may tell you when to take them off. · Do not put lotion or powder on incisions. · Keep your incision out of direct sunlight. · Be safe with medicines. Read and follow all instructions on the label. ¨ If the doctor gave you a prescription medicine for pain, take it as prescribed. ¨ If you are not taking a prescription pain medicine, ask the doctor if you can take an over-the-counter medicine. · Your doctor may give you specific instructions on when you can do your normal activities again, such as driving and going back to work. When should you call for help?   Call 911 anytime you think you may need emergency care. For example, call if:  ? · You passed out (lost consciousness). ? · You have severe trouble breathing. ?Call your doctor now or seek immediate medical care if:  ? · You have symptoms of infection, such as:  ¨ Increased pain, swelling, warmth, or redness. ¨ Red streaks leading from the incision. ¨ Pus draining from the incision or a yellow or green discharge that is increasing. ¨ A fever. ? · You bleed through a bandage. ? · The incision opens up. ? Watch closely for changes in your health, and be sure to contact your doctor if:  ? · The incision is not healing as expected. Where can you learn more? Go to http://renetta-mazin.info/. Enter I882 in the search box to learn more about \"Seroma: Care Instructions. \"  Current as of: March 20, 2017  Content Version: 11.4  © 2612-7052 [employer]. Care instructions adapted under license by [employer] (which disclaims liability or warranty for this information). If you have questions about a medical condition or this instruction, always ask your healthcare professional. [place] [address] any warranty or liability for your use of this information.

## 2017-11-02 NOTE — MR AVS SNAPSHOT
Visit Information Date & Time Provider Department Dept. Phone Encounter #  
 11/2/2017 10:40 AM Nate Cobb NP [facility] [phone] [id number] Upcoming Health Maintenance Date Due DTaP/Tdap/Td series (1 - Tdap) 4/3/1998 INFLUENZA AGE 9 TO ADULT 8/1/2017 Allergies as of 11/2/2017  Review Complete On: 11/2/2017 By: Nate Cobb NP Severity Noted Reaction Type Reactions Sulfa (Sulfonamide Antibiotics)  09/19/2017    Hives And breathing problems Current Immunizations  Never Reviewed No immunizations on file. Not reviewed this visit Vitals BP Pulse Temp Resp Height(growth percentile) Weight(growth percentile) 130/90 (BP 1 Location: Left arm, BP Patient Position: Sitting) 94 98.9 °F (37.2 °C) (Oral) 20 6' 5\" (1.956 m) 320 lb (145.2 kg) SpO2 BMI Smoking Status 98% 37.95 kg/m2 Never Smoker BMI and BSA Data Body Mass Index Body Surface Area  
 37.95 kg/m 2 2.81 m 2 Your Updated Medication List  
  
   
This list is accurate as of: 11/2/17  2:31 PM.  Always use your most recent med list.  
  
  
  
  
 allopurinol 300 mg tablet Commonly known as:  ZYLOPRIM  
300 mg daily. amLODIPine-benazepril 10-40 mg per capsule Commonly known as:  Gracy Rounds Take 1 Cap by mouth daily. metFORMIN 500 mg tablet Commonly known as:  GLUCOPHAGE Take 500 mg by mouth two (2) times daily (with meals). TAKES 1/2 - 1 TAB DAILY DEPENDING ON PHYSICAL ACTIVITY  
  
 montelukast 10 mg tablet Commonly known as:  SINGULAIR Take 10 mg by mouth daily. traZODone 50 mg tablet Commonly known as:  Titus Fray Take 50 mg by mouth nightly. Patient Instructions Seroma: Care Instructions Your Care Instructions After a surgery, fluid can collect under the skin near the cut the doctor made (incision). This soft, puffy area is called a seroma.  It can be tender to touch. The incision may even have opened up. Some seromas get better on their own. But when there is a lot of fluid under the skin, a seroma is drained to help the area heal. 
If your incision has opened up, it may either be packed with gauze or left open to heal. To prevent infection, make sure to keep the area clean and to take all medicines as prescribed. Follow-up care is a key part of your treatment and safety. Be sure to make and go to all appointments, and call your doctor if you are having problems. It's also a good idea to know your test results and keep a list of the medicines you take. How can you care for yourself at home? · Follow your doctor's instructions for seroma care. If you have a drain tube, your doctor will tell you how to take care of it. · Look at the incision every day. Keep the area clean and dry. · Do not bathe unless you can keep the incision dry. Start with sponge baths. Ask your doctor when it is safe to shower. · Do not scrub or rub the incision. And don't wear clothing that rubs it. · Leave any tape strips (such as Steri-Strips) on your incision. They will fall off on their own, or your doctor may tell you when to take them off. · Do not put lotion or powder on incisions. · Keep your incision out of direct sunlight. · Be safe with medicines. Read and follow all instructions on the label. ¨ If the doctor gave you a prescription medicine for pain, take it as prescribed. ¨ If you are not taking a prescription pain medicine, ask the doctor if you can take an over-the-counter medicine. · Your doctor may give you specific instructions on when you can do your normal activities again, such as driving and going back to work. When should you call for help? Call 911 anytime you think you may need emergency care. For example, call if: 
? · You passed out (lost consciousness). ? · You have severe trouble breathing. ?Call your doctor now or seek immediate medical care if: 
? · You have symptoms of infection, such as: 
¨ Increased pain, swelling, warmth, or redness. ¨ Red streaks leading from the incision. ¨ Pus draining from the incision or a yellow or green discharge that is increasing. ¨ A fever. ? · You bleed through a bandage. ? · The incision opens up. ? Watch closely for changes in your health, and be sure to contact your doctor if: 
? · The incision is not healing as expected. Where can you learn more? Go to http://renetta-mazin.info/. Enter N181 in the search box to learn more about \"Seroma: Care Instructions. \" Current as of: March 20, 2017 Content Version: 11.4 © 1350-8989 [employer]. Care instructions adapted under license by [employer] (which disclaims liability or warranty for this information). If you have questions about a medical condition or this instruction, always ask your healthcare professional. [place] [address] any warranty or liability for your use of this information. Introducing [address]! Fabiola Gutierrez introduces FlagTap patient portal. Now you can access parts of your medical record, email your doctor's office, and request medication refills online. 1. In your internet browser, go to [email or web address] 2. Click on the First Time User? Click Here link in the Sign In box. You will see the New Member Sign Up page. 3. Enter your FlagTap Access Code exactly as it appears below. You will not need to use this code after youve completed the sign-up process. If you do not sign up before the expiration date, you must request a new code. · FlagTap Access Code: [email or web address] Expires: 12/18/2017  2:20 PM 
 
4. Enter the last four digits of your Social Security Number (xxxx) and Date of Birth (mm/dd/yyyy) as indicated and click Submit. You will be taken to the next sign-up page. 5. Create a Vimagino ID. This will be your Vimagino login ID and cannot be changed, so think of one that is secure and easy to remember. 6. Create a Vimagino password. You can change your password at any time. 7. Enter your Password Reset Question and Answer. This can be used at a later time if you forget your password. 8. Enter your e-mail address. You will receive e-mail notification when new information is available in [address] 9. Click Sign Up. You can now view and download portions of your medical record. 10. Click the Download Summary menu link to download a portable copy of your medical information. If you have questions, please visit the Frequently Asked Questions section of the Vimagino website. Remember, Vimagino is NOT to be used for urgent needs. For medical emergencies, dial 911. Now available from your iPhone and Android! Please provide this summary of care documentation to your next provider. Your primary care clinician is listed as Martell Sam. If you have any questions after today's visit, please call [phone].

## 2017-11-02 NOTE — PROGRESS NOTES
1. Have you been to the ER, urgent care clinic since your last visit? Hospitalized since your last visit? No    2. Have you seen or consulted any other health care providers outside of the [address] [place] [address] since your last visit? Include any pap smears or colon screening.  No

## (undated) DEVICE — BINDER ABD M/L H12IN FOR 46-62IN WHT 4 SLD PNL DSGN HOOP

## (undated) DEVICE — SUTURE MCRYL SZ 4-0 L27IN ABSRB UD L19MM PS-2 1/2 CIR PRIM Y426H

## (undated) DEVICE — STERILE POLYISOPRENE POWDER-FREE SURGICAL GLOVES WITH EMOLLIENT COATING: Brand: PROTEXIS

## (undated) DEVICE — TIP COVER ACCESSORY

## (undated) DEVICE — KIT DRP 3 ARM ACC DISP ENDOWRIST DA VINCI SI

## (undated) DEVICE — DBD-PACK,LAPAROTOMY,2 REINFORCED GOWNS: Brand: MEDLINE

## (undated) DEVICE — KENDALL SCD EXPRESS SLEEVES, KNEE LENGTH, MEDIUM: Brand: KENDALL SCD

## (undated) DEVICE — INFECTION CONTROL KIT SYS

## (undated) DEVICE — SURGICAL PROCEDURE KIT GEN LAPAROSCOPY LF

## (undated) DEVICE — SEAL

## (undated) DEVICE — SUTURE SZ 0 27IN 5/8 CIR UR-6  TAPER PT VIOLET ABSRB VICRYL J603H

## (undated) DEVICE — BLADELESS OPTICAL TROCAR WITH FIXATION CANNULA: Brand: VERSAPORT

## (undated) DEVICE — SEAL UNIV 5-8MM DISP BX/10 -- DA VINCI XI - SNGL USE

## (undated) DEVICE — (D)PREP SKN CHLRAPRP APPL 26ML -- CONVERT TO ITEM 371833

## (undated) DEVICE — 3000CC GUARDIAN II: Brand: GUARDIAN

## (undated) DEVICE — ARM DRAPE

## (undated) DEVICE — SOLUTION IV 1000ML 0.9% SOD CHL

## (undated) DEVICE — DERMABOND SKIN ADH 0.7ML -- DERMABOND ADVANCED 12/BX

## (undated) DEVICE — BLADELESS OBTURATOR

## (undated) DEVICE — TROCAR SITE CLOSURE DEVICE: Brand: ENDO CLOSE

## (undated) DEVICE — CADIERE FORCEPS: Brand: ENDOWRIST

## (undated) DEVICE — NEEDLE HYPO 22GA L1.5IN BLK S STL HUB POLYPR SHLD REG BVL

## (undated) DEVICE — REDUCER CANN ENDOWRIST 12-8MM -- DA VINCI XI - SNGL USE

## (undated) DEVICE — TUBING INSUF HEAT STRL 10 FT --

## (undated) DEVICE — LARGE NEEDLE DRIVER: Brand: ENDOWRIST

## (undated) DEVICE — DEVON™ KNEE AND BODY STRAP 60" X 3" (1.5 M X 7.6 CM): Brand: DEVON

## (undated) DEVICE — SCISSORS SURG DIA8MM MPLR CRV ENDOWRIST

## (undated) DEVICE — BASIC PACK: Brand: CONVERTORS

## (undated) DEVICE — REM POLYHESIVE ADULT PATIENT RETURN ELECTRODE: Brand: VALLEYLAB